# Patient Record
Sex: MALE | Race: WHITE | NOT HISPANIC OR LATINO | Employment: FULL TIME | URBAN - METROPOLITAN AREA
[De-identification: names, ages, dates, MRNs, and addresses within clinical notes are randomized per-mention and may not be internally consistent; named-entity substitution may affect disease eponyms.]

---

## 2023-05-20 LAB
ALANINE AMINOTRANSFERASE (SGPT) (U/L) IN SER/PLAS: 74 U/L (ref 10–52)
ALBUMIN (G/DL) IN SER/PLAS: 4.5 G/DL (ref 3.4–5)
ALBUMIN (MG/L) IN URINE: <7 MG/L
ALBUMIN/CREATININE (UG/MG) IN URINE: NORMAL UG/MG CRT (ref 0–30)
ALKALINE PHOSPHATASE (U/L) IN SER/PLAS: 63 U/L (ref 33–120)
ANION GAP IN SER/PLAS: 14 MMOL/L (ref 10–20)
APPEARANCE, URINE: CLEAR
ASPARTATE AMINOTRANSFERASE (SGOT) (U/L) IN SER/PLAS: 30 U/L (ref 9–39)
BASOPHILS (10*3/UL) IN BLOOD BY AUTOMATED COUNT: 0.03 X10E9/L (ref 0–0.1)
BASOPHILS/100 LEUKOCYTES IN BLOOD BY AUTOMATED COUNT: 0.6 % (ref 0–2)
BILIRUBIN TOTAL (MG/DL) IN SER/PLAS: 0.6 MG/DL (ref 0–1.2)
BILIRUBIN, URINE: NEGATIVE
BLOOD, URINE: NEGATIVE
CALCIUM (MG/DL) IN SER/PLAS: 9.6 MG/DL (ref 8.6–10.6)
CARBON DIOXIDE, TOTAL (MMOL/L) IN SER/PLAS: 27 MMOL/L (ref 21–32)
CHLORIDE (MMOL/L) IN SER/PLAS: 101 MMOL/L (ref 98–107)
CHOLESTEROL (MG/DL) IN SER/PLAS: 257 MG/DL (ref 0–199)
CHOLESTEROL IN HDL (MG/DL) IN SER/PLAS: 45.8 MG/DL
CHOLESTEROL/HDL RATIO: 5.6
COLOR, URINE: YELLOW
CORTISOL (UG/DL) IN SERUM - AM: 8.2 UG/DL (ref 5–20)
CREATININE (MG/DL) IN SER/PLAS: 0.87 MG/DL (ref 0.5–1.3)
CREATININE (MG/DL) IN URINE: 75.6 MG/DL (ref 20–370)
EOSINOPHILS (10*3/UL) IN BLOOD BY AUTOMATED COUNT: 0.05 X10E9/L (ref 0–0.7)
EOSINOPHILS/100 LEUKOCYTES IN BLOOD BY AUTOMATED COUNT: 1 % (ref 0–6)
ERYTHROCYTE DISTRIBUTION WIDTH (RATIO) BY AUTOMATED COUNT: 13.2 % (ref 11.5–14.5)
ERYTHROCYTE MEAN CORPUSCULAR HEMOGLOBIN CONCENTRATION (G/DL) BY AUTOMATED: 32.4 G/DL (ref 32–36)
ERYTHROCYTE MEAN CORPUSCULAR VOLUME (FL) BY AUTOMATED COUNT: 97 FL (ref 80–100)
ERYTHROCYTES (10*6/UL) IN BLOOD BY AUTOMATED COUNT: 5.19 X10E12/L (ref 4.5–5.9)
GFR MALE: >90 ML/MIN/1.73M2
GLUCOSE (MG/DL) IN SER/PLAS: 84 MG/DL (ref 74–99)
GLUCOSE, URINE: NEGATIVE MG/DL
HEMATOCRIT (%) IN BLOOD BY AUTOMATED COUNT: 50.6 % (ref 41–52)
HEMOGLOBIN (G/DL) IN BLOOD: 16.4 G/DL (ref 13.5–17.5)
IMMATURE GRANULOCYTES/100 LEUKOCYTES IN BLOOD BY AUTOMATED COUNT: 0.2 % (ref 0–0.9)
KETONES, URINE: NEGATIVE MG/DL
LDL: 184 MG/DL (ref 0–99)
LEUKOCYTE ESTERASE, URINE: NEGATIVE
LEUKOCYTES (10*3/UL) IN BLOOD BY AUTOMATED COUNT: 4.9 X10E9/L (ref 4.4–11.3)
LYMPHOCYTES (10*3/UL) IN BLOOD BY AUTOMATED COUNT: 1.61 X10E9/L (ref 1.2–4.8)
LYMPHOCYTES/100 LEUKOCYTES IN BLOOD BY AUTOMATED COUNT: 32.6 % (ref 13–44)
MONOCYTES (10*3/UL) IN BLOOD BY AUTOMATED COUNT: 0.36 X10E9/L (ref 0.1–1)
MONOCYTES/100 LEUKOCYTES IN BLOOD BY AUTOMATED COUNT: 7.3 % (ref 2–10)
NEUTROPHILS (10*3/UL) IN BLOOD BY AUTOMATED COUNT: 2.88 X10E9/L (ref 1.2–7.7)
NEUTROPHILS/100 LEUKOCYTES IN BLOOD BY AUTOMATED COUNT: 58.3 % (ref 40–80)
NITRITE, URINE: NEGATIVE
NRBC (PER 100 WBCS) BY AUTOMATED COUNT: 0 /100 WBC (ref 0–0)
PH, URINE: 8 (ref 5–8)
PLATELETS (10*3/UL) IN BLOOD AUTOMATED COUNT: 192 X10E9/L (ref 150–450)
POTASSIUM (MMOL/L) IN SER/PLAS: 3.8 MMOL/L (ref 3.5–5.3)
PROTEIN TOTAL: 7.6 G/DL (ref 6.4–8.2)
PROTEIN, URINE: NEGATIVE MG/DL
SODIUM (MMOL/L) IN SER/PLAS: 138 MMOL/L (ref 136–145)
SPECIFIC GRAVITY, URINE: 1.01 (ref 1–1.03)
THYROTROPIN (MIU/L) IN SER/PLAS BY DETECTION LIMIT <= 0.05 MIU/L: 1.54 MIU/L (ref 0.44–3.98)
TRIGLYCERIDE (MG/DL) IN SER/PLAS: 135 MG/DL (ref 0–149)
UREA NITROGEN (MG/DL) IN SER/PLAS: 17 MG/DL (ref 6–23)
UROBILINOGEN, URINE: <2 MG/DL (ref 0–1.9)
VLDL: 27 MG/DL (ref 0–40)

## 2023-05-23 LAB — ALDOSTERONE IN SERUM: 19.6 NG/DL

## 2023-09-29 ENCOUNTER — LAB (OUTPATIENT)
Dept: LAB | Facility: LAB | Age: 33
End: 2023-09-29
Payer: COMMERCIAL

## 2023-09-29 LAB — TOBACCO SCREEN, URINE: NEGATIVE

## 2023-10-18 ENCOUNTER — PHARMACY VISIT (OUTPATIENT)
Dept: PHARMACY | Facility: CLINIC | Age: 33
End: 2023-10-18
Payer: COMMERCIAL

## 2023-10-18 PROCEDURE — RXMED WILLOW AMBULATORY MEDICATION CHARGE

## 2023-10-26 DIAGNOSIS — I10 HYPERTENSION, UNSPECIFIED TYPE: ICD-10-CM

## 2023-10-26 RX ORDER — AMLODIPINE BESYLATE 5 MG/1
5 TABLET ORAL DAILY
Qty: 90 TABLET | Refills: 0 | Status: SHIPPED | OUTPATIENT
Start: 2023-10-26 | End: 2023-10-31 | Stop reason: CLARIF

## 2023-10-26 RX ORDER — AMLODIPINE BESYLATE 5 MG/1
5 TABLET ORAL DAILY
COMMUNITY
Start: 2023-05-18 | End: 2023-10-26 | Stop reason: SDUPTHER

## 2023-10-31 ENCOUNTER — TELEPHONE (OUTPATIENT)
Dept: PRIMARY CARE | Facility: CLINIC | Age: 33
End: 2023-10-31
Payer: COMMERCIAL

## 2023-10-31 DIAGNOSIS — I10 HYPERTENSION, UNSPECIFIED TYPE: Primary | ICD-10-CM

## 2023-10-31 RX ORDER — AMLODIPINE BESYLATE 5 MG/1
5 TABLET ORAL DAILY
Qty: 30 TABLET | Refills: 5 | Status: SHIPPED | OUTPATIENT
Start: 2023-10-31 | End: 2023-11-07 | Stop reason: WASHOUT

## 2023-11-06 ENCOUNTER — LAB (OUTPATIENT)
Dept: LAB | Facility: LAB | Age: 33
End: 2023-11-06
Payer: COMMERCIAL

## 2023-11-06 DIAGNOSIS — E78.5 HYPERLIPIDEMIA, UNSPECIFIED: Primary | ICD-10-CM

## 2023-11-06 DIAGNOSIS — Z79.899 OTHER LONG TERM (CURRENT) DRUG THERAPY: ICD-10-CM

## 2023-11-06 PROCEDURE — 36415 COLL VENOUS BLD VENIPUNCTURE: CPT

## 2023-11-06 PROCEDURE — 80061 LIPID PANEL: CPT

## 2023-11-06 PROCEDURE — 80076 HEPATIC FUNCTION PANEL: CPT

## 2023-11-07 ENCOUNTER — OFFICE VISIT (OUTPATIENT)
Dept: PRIMARY CARE | Facility: CLINIC | Age: 33
End: 2023-11-07
Payer: COMMERCIAL

## 2023-11-07 ENCOUNTER — PHARMACY VISIT (OUTPATIENT)
Dept: PHARMACY | Facility: CLINIC | Age: 33
End: 2023-11-07
Payer: COMMERCIAL

## 2023-11-07 VITALS
BODY MASS INDEX: 31.67 KG/M2 | HEIGHT: 68 IN | WEIGHT: 209 LBS | HEART RATE: 78 BPM | DIASTOLIC BLOOD PRESSURE: 90 MMHG | SYSTOLIC BLOOD PRESSURE: 148 MMHG

## 2023-11-07 DIAGNOSIS — Z82.49 FAMILY HISTORY OF ISCHEMIC HEART DISEASE: ICD-10-CM

## 2023-11-07 DIAGNOSIS — R06.83 SNORING: ICD-10-CM

## 2023-11-07 DIAGNOSIS — E78.2 MIXED HYPERLIPIDEMIA: ICD-10-CM

## 2023-11-07 DIAGNOSIS — I10 HYPERTENSION, UNSPECIFIED TYPE: Primary | ICD-10-CM

## 2023-11-07 LAB
ALBUMIN SERPL BCP-MCNC: 4.8 G/DL (ref 3.4–5)
ALP SERPL-CCNC: 66 U/L (ref 33–120)
ALT SERPL W P-5'-P-CCNC: 87 U/L (ref 10–52)
AST SERPL W P-5'-P-CCNC: 35 U/L (ref 9–39)
BILIRUB DIRECT SERPL-MCNC: 0.1 MG/DL (ref 0–0.3)
BILIRUB SERPL-MCNC: 0.6 MG/DL (ref 0–1.2)
CHOLEST SERPL-MCNC: 291 MG/DL (ref 0–199)
CHOLESTEROL/HDL RATIO: 7.9
HDLC SERPL-MCNC: 36.7 MG/DL
LDLC SERPL CALC-MCNC: 213 MG/DL
NON HDL CHOLESTEROL: 254 MG/DL (ref 0–149)
PROT SERPL-MCNC: 7.8 G/DL (ref 6.4–8.2)
TRIGL SERPL-MCNC: 205 MG/DL (ref 0–149)
VLDL: 41 MG/DL (ref 0–40)

## 2023-11-07 PROCEDURE — 99213 OFFICE O/P EST LOW 20 MIN: CPT | Performed by: INTERNAL MEDICINE

## 2023-11-07 PROCEDURE — 3077F SYST BP >= 140 MM HG: CPT | Performed by: INTERNAL MEDICINE

## 2023-11-07 PROCEDURE — 3080F DIAST BP >= 90 MM HG: CPT | Performed by: INTERNAL MEDICINE

## 2023-11-07 PROCEDURE — RXMED WILLOW AMBULATORY MEDICATION CHARGE

## 2023-11-07 PROCEDURE — 1036F TOBACCO NON-USER: CPT | Performed by: INTERNAL MEDICINE

## 2023-11-07 RX ORDER — BUSPIRONE HYDROCHLORIDE 10 MG/1
10 TABLET ORAL 3 TIMES DAILY
COMMUNITY

## 2023-11-07 RX ORDER — ATORVASTATIN CALCIUM 10 MG/1
10 TABLET, FILM COATED ORAL DAILY
Qty: 30 TABLET | Refills: 5 | Status: SHIPPED | OUTPATIENT
Start: 2023-11-07 | End: 2023-11-07 | Stop reason: SDUPTHER

## 2023-11-07 RX ORDER — FAMOTIDINE 20 MG/1
20 TABLET, FILM COATED ORAL DAILY
COMMUNITY

## 2023-11-07 RX ORDER — SERTRALINE HYDROCHLORIDE 100 MG/1
100 TABLET, FILM COATED ORAL DAILY
COMMUNITY

## 2023-11-07 RX ORDER — ALBUTEROL SULFATE 90 UG/1
2 AEROSOL, METERED RESPIRATORY (INHALATION) EVERY 6 HOURS PRN
COMMUNITY

## 2023-11-07 RX ORDER — ATORVASTATIN CALCIUM 10 MG/1
10 TABLET, FILM COATED ORAL DAILY
Qty: 30 TABLET | Refills: 5 | Status: SHIPPED | OUTPATIENT
Start: 2023-11-07 | End: 2024-05-05 | Stop reason: SDUPTHER

## 2023-11-07 RX ORDER — MONTELUKAST SODIUM 10 MG/1
10 TABLET ORAL NIGHTLY
COMMUNITY

## 2023-11-07 RX ORDER — FLUTICASONE PROPIONATE 50 MCG
1 SPRAY, SUSPENSION (ML) NASAL DAILY
COMMUNITY

## 2023-11-07 RX ORDER — AMLODIPINE AND BENAZEPRIL HYDROCHLORIDE 5; 10 MG/1; MG/1
1 CAPSULE ORAL DAILY
Qty: 30 CAPSULE | Refills: 11 | Status: SHIPPED | OUTPATIENT
Start: 2023-11-07 | End: 2024-11-06

## 2023-11-07 NOTE — PROGRESS NOTES
"Subjective   Patient ID: Jose Zavala is a 33 y.o. male who presents for Follow-up (Discuss lab results).    HPI lipids   Htn  Stress    Review of Systems    Objective   /90 (BP Location: Left arm, Patient Position: Sitting, BP Cuff Size: Adult)   Pulse 78   Ht 1.727 m (5' 8\")   Wt 94.8 kg (209 lb)   BMI 31.78 kg/m²     Physical Exam  Card rrr  Pulm cta  Abd soft nt bs plus    Assessment/Plan   Diagnoses and all orders for this visit:  Hypertension, unspecified type  Comments:  not at goal  Orders:  -     amLODIPine-benazepriL (LotreL) 5-10 mg capsule; Take 1 capsule by mouth once daily.  -     atorvastatin (Lipitor) 10 mg tablet; Take 1 tablet (10 mg) by mouth once daily.  -     Comprehensive Metabolic Panel; Future  -     Lipid Panel; Future  Mixed hyperlipidemia  Comments:  severe time to treat  Orders:  -     atorvastatin (Lipitor) 10 mg tablet; Take 1 tablet (10 mg) by mouth once daily.  -     Comprehensive Metabolic Panel; Future  -     Lipid Panel; Future  Family history of ischemic heart disease  Comments:  sign early  Orders:  -     Comprehensive Metabolic Panel; Future       "

## 2023-11-08 ENCOUNTER — PHARMACY VISIT (OUTPATIENT)
Dept: PHARMACY | Facility: CLINIC | Age: 33
End: 2023-11-08
Payer: COMMERCIAL

## 2023-11-08 PROCEDURE — RXMED WILLOW AMBULATORY MEDICATION CHARGE

## 2023-11-21 ENCOUNTER — APPOINTMENT (OUTPATIENT)
Dept: PRIMARY CARE | Facility: CLINIC | Age: 33
End: 2023-11-21
Payer: COMMERCIAL

## 2023-12-01 ENCOUNTER — PHARMACY VISIT (OUTPATIENT)
Dept: PHARMACY | Facility: CLINIC | Age: 33
End: 2023-12-01
Payer: COMMERCIAL

## 2023-12-01 PROCEDURE — RXMED WILLOW AMBULATORY MEDICATION CHARGE

## 2023-12-19 ENCOUNTER — CLINICAL SUPPORT (OUTPATIENT)
Dept: SLEEP MEDICINE | Facility: HOSPITAL | Age: 33
End: 2023-12-19
Payer: COMMERCIAL

## 2023-12-19 ENCOUNTER — APPOINTMENT (OUTPATIENT)
Dept: SLEEP MEDICINE | Facility: HOSPITAL | Age: 33
End: 2023-12-19
Payer: COMMERCIAL

## 2023-12-19 DIAGNOSIS — R06.83 SNORING: ICD-10-CM

## 2023-12-19 PROCEDURE — 95806 SLEEP STUDY UNATT&RESP EFFT: CPT | Performed by: PSYCHIATRY & NEUROLOGY

## 2024-01-04 PROCEDURE — RXMED WILLOW AMBULATORY MEDICATION CHARGE

## 2024-01-05 ENCOUNTER — PHARMACY VISIT (OUTPATIENT)
Dept: PHARMACY | Facility: CLINIC | Age: 34
End: 2024-01-05
Payer: COMMERCIAL

## 2024-01-09 ENCOUNTER — LAB (OUTPATIENT)
Dept: LAB | Facility: LAB | Age: 34
End: 2024-01-09
Payer: COMMERCIAL

## 2024-01-09 DIAGNOSIS — I10 HYPERTENSION, UNSPECIFIED TYPE: ICD-10-CM

## 2024-01-09 DIAGNOSIS — Z82.49 FAMILY HISTORY OF ISCHEMIC HEART DISEASE: ICD-10-CM

## 2024-01-09 DIAGNOSIS — E78.2 MIXED HYPERLIPIDEMIA: ICD-10-CM

## 2024-01-09 PROCEDURE — 80061 LIPID PANEL: CPT

## 2024-01-09 PROCEDURE — 80053 COMPREHEN METABOLIC PANEL: CPT

## 2024-01-09 PROCEDURE — 36415 COLL VENOUS BLD VENIPUNCTURE: CPT

## 2024-01-10 LAB
ALBUMIN SERPL BCP-MCNC: 4.6 G/DL (ref 3.4–5)
ALP SERPL-CCNC: 64 U/L (ref 33–120)
ALT SERPL W P-5'-P-CCNC: 78 U/L (ref 10–52)
ANION GAP SERPL CALC-SCNC: 11 MMOL/L (ref 10–20)
AST SERPL W P-5'-P-CCNC: 31 U/L (ref 9–39)
BILIRUB SERPL-MCNC: 0.4 MG/DL (ref 0–1.2)
BUN SERPL-MCNC: 13 MG/DL (ref 6–23)
CALCIUM SERPL-MCNC: 9.4 MG/DL (ref 8.6–10.6)
CHLORIDE SERPL-SCNC: 102 MMOL/L (ref 98–107)
CHOLEST SERPL-MCNC: 172 MG/DL (ref 0–199)
CHOLESTEROL/HDL RATIO: 4.4
CO2 SERPL-SCNC: 30 MMOL/L (ref 21–32)
CREAT SERPL-MCNC: 0.8 MG/DL (ref 0.5–1.3)
EGFRCR SERPLBLD CKD-EPI 2021: >90 ML/MIN/1.73M*2
GLUCOSE SERPL-MCNC: 90 MG/DL (ref 74–99)
HDLC SERPL-MCNC: 39 MG/DL
LDLC SERPL CALC-MCNC: 104 MG/DL
NON HDL CHOLESTEROL: 133 MG/DL (ref 0–149)
POTASSIUM SERPL-SCNC: 4.2 MMOL/L (ref 3.5–5.3)
PROT SERPL-MCNC: 7.5 G/DL (ref 6.4–8.2)
SODIUM SERPL-SCNC: 139 MMOL/L (ref 136–145)
TRIGL SERPL-MCNC: 145 MG/DL (ref 0–149)
VLDL: 29 MG/DL (ref 0–40)

## 2024-01-16 ENCOUNTER — APPOINTMENT (OUTPATIENT)
Dept: PRIMARY CARE | Facility: CLINIC | Age: 34
End: 2024-01-16
Payer: COMMERCIAL

## 2024-01-19 ENCOUNTER — TELEPHONE (OUTPATIENT)
Dept: PRIMARY CARE | Facility: CLINIC | Age: 34
End: 2024-01-19
Payer: COMMERCIAL

## 2024-01-19 NOTE — TELEPHONE ENCOUNTER
----- Message from Christian Titus MD sent at 1/19/2024 12:08 PM EST -----  Labs are normal  MUCH IMPROVED

## 2024-02-05 PROCEDURE — RXMED WILLOW AMBULATORY MEDICATION CHARGE

## 2024-02-06 ENCOUNTER — PHARMACY VISIT (OUTPATIENT)
Dept: PHARMACY | Facility: CLINIC | Age: 34
End: 2024-02-06
Payer: COMMERCIAL

## 2024-03-05 PROCEDURE — RXMED WILLOW AMBULATORY MEDICATION CHARGE

## 2024-03-07 ENCOUNTER — PHARMACY VISIT (OUTPATIENT)
Dept: PHARMACY | Facility: CLINIC | Age: 34
End: 2024-03-07
Payer: COMMERCIAL

## 2024-04-05 PROCEDURE — RXMED WILLOW AMBULATORY MEDICATION CHARGE

## 2024-04-07 ENCOUNTER — PHARMACY VISIT (OUTPATIENT)
Dept: PHARMACY | Facility: CLINIC | Age: 34
End: 2024-04-07
Payer: COMMERCIAL

## 2024-04-15 PROCEDURE — RXMED WILLOW AMBULATORY MEDICATION CHARGE

## 2024-04-21 ENCOUNTER — PHARMACY VISIT (OUTPATIENT)
Dept: PHARMACY | Facility: CLINIC | Age: 34
End: 2024-04-21
Payer: COMMERCIAL

## 2024-04-22 ENCOUNTER — APPOINTMENT (OUTPATIENT)
Dept: GASTROENTEROLOGY | Facility: CLINIC | Age: 34
End: 2024-04-22
Payer: COMMERCIAL

## 2024-05-05 DIAGNOSIS — E78.2 MIXED HYPERLIPIDEMIA: ICD-10-CM

## 2024-05-05 DIAGNOSIS — I10 HYPERTENSION, UNSPECIFIED TYPE: ICD-10-CM

## 2024-05-06 PROCEDURE — RXMED WILLOW AMBULATORY MEDICATION CHARGE

## 2024-05-06 RX ORDER — ATORVASTATIN CALCIUM 10 MG/1
10 TABLET, FILM COATED ORAL DAILY
Qty: 30 TABLET | Refills: 5 | Status: SHIPPED | OUTPATIENT
Start: 2024-05-06 | End: 2024-11-02

## 2024-05-06 ASSESSMENT — PROMIS GLOBAL HEALTH SCALE
RATE_QUALITY_OF_LIFE: VERY GOOD
CARRYOUT_SOCIAL_ACTIVITIES: VERY GOOD
RATE_PHYSICAL_HEALTH: GOOD
RATE_MENTAL_HEALTH: GOOD
EMOTIONAL_PROBLEMS: SOMETIMES
RATE_AVERAGE_FATIGUE: MILD
RATE_GENERAL_HEALTH: GOOD
RATE_AVERAGE_PAIN: 0
CARRYOUT_PHYSICAL_ACTIVITIES: COMPLETELY
RATE_SOCIAL_SATISFACTION: GOOD

## 2024-05-07 ENCOUNTER — OFFICE VISIT (OUTPATIENT)
Dept: PRIMARY CARE | Facility: CLINIC | Age: 34
End: 2024-05-07
Payer: COMMERCIAL

## 2024-05-07 ENCOUNTER — PHARMACY VISIT (OUTPATIENT)
Dept: PHARMACY | Facility: CLINIC | Age: 34
End: 2024-05-07
Payer: COMMERCIAL

## 2024-05-07 VITALS
WEIGHT: 204 LBS | DIASTOLIC BLOOD PRESSURE: 74 MMHG | HEART RATE: 94 BPM | SYSTOLIC BLOOD PRESSURE: 116 MMHG | BODY MASS INDEX: 30.92 KG/M2 | OXYGEN SATURATION: 97 % | HEIGHT: 68 IN

## 2024-05-07 DIAGNOSIS — F41.9 ANXIETY: ICD-10-CM

## 2024-05-07 DIAGNOSIS — Z00.00 WELLNESS EXAMINATION: Primary | ICD-10-CM

## 2024-05-07 DIAGNOSIS — Z13.9 SCREENING DUE: ICD-10-CM

## 2024-05-07 DIAGNOSIS — R73.03 PREDIABETES: ICD-10-CM

## 2024-05-07 DIAGNOSIS — J31.0 OTHER RHINITIS: ICD-10-CM

## 2024-05-07 DIAGNOSIS — R79.89 LOW TESTOSTERONE: ICD-10-CM

## 2024-05-07 PROBLEM — E03.9 HYPOTHYROIDISM: Status: ACTIVE | Noted: 2024-05-07

## 2024-05-07 PROCEDURE — 99395 PREV VISIT EST AGE 18-39: CPT | Performed by: INTERNAL MEDICINE

## 2024-05-07 PROCEDURE — 3008F BODY MASS INDEX DOCD: CPT | Performed by: INTERNAL MEDICINE

## 2024-05-07 PROCEDURE — 3074F SYST BP LT 130 MM HG: CPT | Performed by: INTERNAL MEDICINE

## 2024-05-07 PROCEDURE — 3078F DIAST BP <80 MM HG: CPT | Performed by: INTERNAL MEDICINE

## 2024-05-07 PROCEDURE — RXMED WILLOW AMBULATORY MEDICATION CHARGE

## 2024-05-07 PROCEDURE — 1036F TOBACCO NON-USER: CPT | Performed by: INTERNAL MEDICINE

## 2024-05-07 RX ORDER — FLUTICASONE PROPIONATE 50 MCG
1 SPRAY, SUSPENSION (ML) NASAL
Qty: 16 G | Refills: 5 | Status: SHIPPED | OUTPATIENT
Start: 2024-05-07 | End: 2025-05-07

## 2024-05-07 NOTE — PROGRESS NOTES
"Subjective   Patient ID: Jose Zavala is a 34 y.o. male who presents for Annual Exam.    HPI FEELS WELL       WIFE IS NOW WORKING     NO KIDS YET     BREATHING IS GOOD     MOOD IS GOOD    ENERGY IS GOOD     SLEEP IS GOOD     DIET IS FRESH FRUITS VEGGIES     SWEETS SOME     ETOH  6 PER WEEK     TOBACCO NONE     PSHX NONE NEW    FMHX MA LIPIDS    D A HTN   SIBS BRO CHOL   Review of Systems   Respiratory:          ASTHMA     LIPIDS   Objective   /74   Pulse 94   Ht 1.727 m (5' 8\")   Wt 92.5 kg (204 lb)   SpO2 97%   BMI 31.02 kg/m²     Physical Exam  Constitutional:       Appearance: Normal appearance.   HENT:      Head: Normocephalic and atraumatic.      Right Ear: Tympanic membrane normal.      Left Ear: Tympanic membrane normal.      Nose: Nose normal.   Eyes:      Extraocular Movements: Extraocular movements intact.      Conjunctiva/sclera: Conjunctivae normal.      Pupils: Pupils are equal, round, and reactive to light.   Cardiovascular:      Rate and Rhythm: Normal rate and regular rhythm.      Pulses: Normal pulses.      Heart sounds: Normal heart sounds.   Pulmonary:      Effort: Pulmonary effort is normal.      Breath sounds: Normal breath sounds.   Abdominal:      General: Abdomen is flat. Bowel sounds are normal.      Palpations: Abdomen is soft.   Musculoskeletal:         General: Normal range of motion.      Cervical back: Normal range of motion and neck supple.   Skin:     General: Skin is warm and dry.   Neurological:      General: No focal deficit present.      Mental Status: Mental status is at baseline.   Psychiatric:         Mood and Affect: Mood normal.         Behavior: Behavior normal.         Thought Content: Thought content normal.         Judgment: Judgment normal.       Assessment/Plan   Diagnoses and all orders for this visit:  Wellness examination  -     CBC; Future  -     Lipid Panel; Future  -     Comprehensive Metabolic Panel; Future  -     Urinalysis with Reflex Culture and " Microscopic  -     Vitamin B12; Future  Screening due  -     TSH with reflex to Free T4 if abnormal; Future  -     Urinalysis with Reflex Culture and Microscopic  -     Vitamin B12; Future  Low testosterone  -     Testosterone; Future  -     Urinalysis with Reflex Culture and Microscopic  -     Vitamin B12; Future  Prediabetes  -     Hemoglobin A1C; Future  -     Urinalysis with Reflex Culture and Microscopic  -     Vitamin B12; Future  BMI 31.0-31.9,adult  Comments:  DIET CARDIO  Other rhinitis  -     fluticasone (Flonase Sensimist) 27.5 mcg/actuation nasal spray; Administer 1 spray into each nostril once daily.  Anxiety  Other orders  -     Follow Up In Primary Care - Established

## 2024-05-21 ENCOUNTER — APPOINTMENT (OUTPATIENT)
Dept: PRIMARY CARE | Facility: CLINIC | Age: 34
End: 2024-05-21
Payer: COMMERCIAL

## 2024-06-06 ENCOUNTER — PHARMACY VISIT (OUTPATIENT)
Dept: PHARMACY | Facility: CLINIC | Age: 34
End: 2024-06-06
Payer: COMMERCIAL

## 2024-06-06 PROCEDURE — RXMED WILLOW AMBULATORY MEDICATION CHARGE

## 2024-06-07 PROCEDURE — RXMED WILLOW AMBULATORY MEDICATION CHARGE

## 2024-06-10 ENCOUNTER — PHARMACY VISIT (OUTPATIENT)
Dept: PHARMACY | Facility: CLINIC | Age: 34
End: 2024-06-10
Payer: COMMERCIAL

## 2024-06-25 PROBLEM — K31.A0 INTESTINAL METAPLASIA OF STOMACH: Status: ACTIVE | Noted: 2024-06-25

## 2024-06-25 PROBLEM — J45.50: Status: ACTIVE | Noted: 2022-08-08

## 2024-06-25 PROBLEM — J30.89 ENVIRONMENTAL AND SEASONAL ALLERGIES: Status: ACTIVE | Noted: 2022-08-08

## 2024-06-25 PROBLEM — K21.9 GASTROESOPHAGEAL REFLUX DISEASE: Status: ACTIVE | Noted: 2024-06-25

## 2024-06-25 PROBLEM — J45.909 ASTHMA (HHS-HCC): Status: ACTIVE | Noted: 2024-06-25

## 2024-06-25 PROBLEM — K29.50 CHRONIC GASTRITIS: Status: ACTIVE | Noted: 2024-06-25

## 2024-06-25 PROBLEM — Z20.822 CONTACT WITH AND (SUSPECTED) EXPOSURE TO COVID-19: Status: ACTIVE | Noted: 2023-03-19

## 2024-06-25 PROBLEM — R74.8 ELEVATED LIVER ENZYMES: Status: ACTIVE | Noted: 2024-06-25

## 2024-06-25 PROBLEM — R06.83 SNORING: Status: ACTIVE | Noted: 2024-06-25

## 2024-07-02 ENCOUNTER — LAB (OUTPATIENT)
Dept: LAB | Facility: LAB | Age: 34
End: 2024-07-02
Payer: COMMERCIAL

## 2024-07-02 DIAGNOSIS — R79.89 LOW TESTOSTERONE: ICD-10-CM

## 2024-07-02 DIAGNOSIS — R73.03 PREDIABETES: ICD-10-CM

## 2024-07-02 DIAGNOSIS — Z00.00 WELLNESS EXAMINATION: ICD-10-CM

## 2024-07-02 DIAGNOSIS — Z13.9 SCREENING DUE: ICD-10-CM

## 2024-07-02 PROCEDURE — 84443 ASSAY THYROID STIM HORMONE: CPT

## 2024-07-02 PROCEDURE — 85027 COMPLETE CBC AUTOMATED: CPT

## 2024-07-02 PROCEDURE — 83036 HEMOGLOBIN GLYCOSYLATED A1C: CPT

## 2024-07-02 PROCEDURE — 81003 URINALYSIS AUTO W/O SCOPE: CPT

## 2024-07-02 PROCEDURE — 80061 LIPID PANEL: CPT

## 2024-07-02 PROCEDURE — 82607 VITAMIN B-12: CPT

## 2024-07-02 PROCEDURE — 80053 COMPREHEN METABOLIC PANEL: CPT

## 2024-07-02 PROCEDURE — 36415 COLL VENOUS BLD VENIPUNCTURE: CPT

## 2024-07-02 PROCEDURE — RXMED WILLOW AMBULATORY MEDICATION CHARGE

## 2024-07-02 PROCEDURE — 84403 ASSAY OF TOTAL TESTOSTERONE: CPT

## 2024-07-03 LAB
ALBUMIN SERPL BCP-MCNC: 4.5 G/DL (ref 3.4–5)
ALP SERPL-CCNC: 65 U/L (ref 33–120)
ALT SERPL W P-5'-P-CCNC: 72 U/L (ref 10–52)
ANION GAP SERPL CALC-SCNC: 12 MMOL/L (ref 10–20)
APPEARANCE UR: CLEAR
AST SERPL W P-5'-P-CCNC: 31 U/L (ref 9–39)
BILIRUB SERPL-MCNC: 0.6 MG/DL (ref 0–1.2)
BILIRUB UR STRIP.AUTO-MCNC: NEGATIVE MG/DL
BUN SERPL-MCNC: 11 MG/DL (ref 6–23)
CALCIUM SERPL-MCNC: 9.5 MG/DL (ref 8.6–10.6)
CHLORIDE SERPL-SCNC: 103 MMOL/L (ref 98–107)
CHOLEST SERPL-MCNC: 189 MG/DL (ref 0–199)
CHOLESTEROL/HDL RATIO: 5
CO2 SERPL-SCNC: 29 MMOL/L (ref 21–32)
COLOR UR: YELLOW
CREAT SERPL-MCNC: 0.82 MG/DL (ref 0.5–1.3)
EGFRCR SERPLBLD CKD-EPI 2021: >90 ML/MIN/1.73M*2
ERYTHROCYTE [DISTWIDTH] IN BLOOD BY AUTOMATED COUNT: 12.6 % (ref 11.5–14.5)
EST. AVERAGE GLUCOSE BLD GHB EST-MCNC: 100 MG/DL
GLUCOSE SERPL-MCNC: 86 MG/DL (ref 74–99)
GLUCOSE UR STRIP.AUTO-MCNC: NORMAL MG/DL
HBA1C MFR BLD: 5.1 %
HCT VFR BLD AUTO: 51.3 % (ref 41–52)
HDLC SERPL-MCNC: 37.6 MG/DL
HGB BLD-MCNC: 16.7 G/DL (ref 13.5–17.5)
HOLD SPECIMEN: NORMAL
KETONES UR STRIP.AUTO-MCNC: NEGATIVE MG/DL
LDLC SERPL CALC-MCNC: 123 MG/DL
LEUKOCYTE ESTERASE UR QL STRIP.AUTO: NEGATIVE
MCH RBC QN AUTO: 31.6 PG (ref 26–34)
MCHC RBC AUTO-ENTMCNC: 32.6 G/DL (ref 32–36)
MCV RBC AUTO: 97 FL (ref 80–100)
NITRITE UR QL STRIP.AUTO: NEGATIVE
NON HDL CHOLESTEROL: 151 MG/DL (ref 0–149)
NRBC BLD-RTO: 0 /100 WBCS (ref 0–0)
PH UR STRIP.AUTO: 7 [PH]
PLATELET # BLD AUTO: 208 X10*3/UL (ref 150–450)
POTASSIUM SERPL-SCNC: 4.1 MMOL/L (ref 3.5–5.3)
PROT SERPL-MCNC: 7.4 G/DL (ref 6.4–8.2)
PROT UR STRIP.AUTO-MCNC: NEGATIVE MG/DL
RBC # BLD AUTO: 5.28 X10*6/UL (ref 4.5–5.9)
RBC # UR STRIP.AUTO: NEGATIVE /UL
SODIUM SERPL-SCNC: 140 MMOL/L (ref 136–145)
SP GR UR STRIP.AUTO: 1.02
TESTOST SERPL-MCNC: 390 NG/DL (ref 240–1000)
TRIGL SERPL-MCNC: 141 MG/DL (ref 0–149)
TSH SERPL-ACNC: 1.02 MIU/L (ref 0.44–3.98)
UROBILINOGEN UR STRIP.AUTO-MCNC: NORMAL MG/DL
VIT B12 SERPL-MCNC: 580 PG/ML (ref 211–911)
VLDL: 28 MG/DL (ref 0–40)
WBC # BLD AUTO: 5.4 X10*3/UL (ref 4.4–11.3)

## 2024-07-03 PROCEDURE — RXMED WILLOW AMBULATORY MEDICATION CHARGE

## 2024-07-05 ENCOUNTER — PHARMACY VISIT (OUTPATIENT)
Dept: PHARMACY | Facility: CLINIC | Age: 34
End: 2024-07-05
Payer: COMMERCIAL

## 2024-07-11 ENCOUNTER — TELEPHONE (OUTPATIENT)
Dept: PRIMARY CARE | Facility: CLINIC | Age: 34
End: 2024-07-11
Payer: COMMERCIAL

## 2024-07-11 NOTE — TELEPHONE ENCOUNTER
----- Message from Christian Titus sent at 7/11/2024  6:47 AM EDT -----  Labs are normal with one liver enzyme minimally elevated    
Sent patient note on mychart advising results    
TELEMETRY

## 2024-07-22 DIAGNOSIS — J45.50: ICD-10-CM

## 2024-07-23 RX ORDER — FLUTICASONE FUROATE AND VILANTEROL 200; 25 UG/1; UG/1
1 POWDER RESPIRATORY (INHALATION) DAILY
Qty: 180 EACH | Refills: 3 | Status: SHIPPED | OUTPATIENT
Start: 2024-07-23 | End: 2025-07-23

## 2024-07-23 NOTE — TELEPHONE ENCOUNTER
Pt reached out via [employer] message regarding the need of a refill on his Breo. Refill order placed and routed to Dr. Avila to sign. Pt was updated via [employer] message.

## 2024-07-26 ENCOUNTER — APPOINTMENT (OUTPATIENT)
Dept: PULMONOLOGY | Facility: CLINIC | Age: 34
End: 2024-07-26
Payer: COMMERCIAL

## 2024-07-26 DIAGNOSIS — J45.50: ICD-10-CM

## 2024-07-29 RX ORDER — FLUTICASONE FUROATE AND VILANTEROL 200; 25 UG/1; UG/1
1 POWDER RESPIRATORY (INHALATION) DAILY
Qty: 180 EACH | Refills: 3 | Status: SHIPPED | OUTPATIENT
Start: 2024-07-29 | End: 2025-07-29

## 2024-07-31 ENCOUNTER — PHARMACY VISIT (OUTPATIENT)
Dept: PHARMACY | Facility: CLINIC | Age: 34
End: 2024-07-31
Payer: COMMERCIAL

## 2024-07-31 PROCEDURE — RXMED WILLOW AMBULATORY MEDICATION CHARGE

## 2024-08-08 ENCOUNTER — APPOINTMENT (OUTPATIENT)
Dept: GASTROENTEROLOGY | Facility: CLINIC | Age: 34
End: 2024-08-08
Payer: COMMERCIAL

## 2024-08-27 ENCOUNTER — PATIENT MESSAGE (OUTPATIENT)
Dept: PRIMARY CARE | Facility: CLINIC | Age: 34
End: 2024-08-27
Payer: COMMERCIAL

## 2024-08-27 DIAGNOSIS — J31.0 OTHER RHINITIS: ICD-10-CM

## 2024-08-27 RX ORDER — MONTELUKAST SODIUM 10 MG/1
10 TABLET ORAL NIGHTLY
Qty: 90 TABLET | Refills: 1 | Status: SHIPPED | OUTPATIENT
Start: 2024-08-27

## 2024-08-28 PROCEDURE — RXMED WILLOW AMBULATORY MEDICATION CHARGE

## 2024-08-30 ENCOUNTER — PHARMACY VISIT (OUTPATIENT)
Dept: PHARMACY | Facility: CLINIC | Age: 34
End: 2024-08-30
Payer: COMMERCIAL

## 2024-09-06 ENCOUNTER — TELEPHONE (OUTPATIENT)
Dept: PULMONOLOGY | Facility: HOSPITAL | Age: 34
End: 2024-09-06
Payer: COMMERCIAL

## 2024-09-06 NOTE — TELEPHONE ENCOUNTER
Pt called with c/o sob and productive cough. He has been swallowing the mucous so is unsure of its color. He hasn't noticed wheezing yet, which he attributes it to using his aerosol machine. He denies fever and chills. His family was +Covid about 2 weeks ago. He was asymptomatic, but still took a covid test that was negative. He will take another covid test today. He is currently on vacation at [facility]. He brought his aerosol machine with him and is taking his duonebs every 6 hours and Pulmicort neb BID. He is also taking his Breo, singular, and zyrtec as prescribed. He is getting some relief with his aerosol treatments, but its not long before symptoms return. He states he doesn't like to take prednisone, but feels like he may need it this time. He also needs a refill for his duonebs. He stated that he will be in [facility] until Monday. For the refill of duonebs and any possible new prescriptions send to [facility] on [address] [place] [facility] [place] [address]. Dr. Avila notified.

## 2024-09-07 DIAGNOSIS — J45.50: Primary | ICD-10-CM

## 2024-09-07 RX ORDER — AZITHROMYCIN 250 MG/1
250 TABLET, FILM COATED ORAL DAILY
Qty: 6 TABLET | Refills: 0 | Status: SHIPPED | OUTPATIENT
Start: 2024-09-07 | End: 2024-09-19

## 2024-09-07 RX ORDER — PREDNISONE 20 MG/1
40 TABLET ORAL DAILY
Qty: 10 TABLET | Refills: 0 | Status: SHIPPED | OUTPATIENT
Start: 2024-09-07 | End: 2024-09-10

## 2024-09-07 RX ORDER — IPRATROPIUM BROMIDE AND ALBUTEROL SULFATE 2.5; .5 MG/3ML; MG/3ML
3 SOLUTION RESPIRATORY (INHALATION) EVERY 6 HOURS PRN
Qty: 360 ML | Refills: 5 | Status: SHIPPED | OUTPATIENT
Start: 2024-09-07

## 2024-09-07 NOTE — PROGRESS NOTES
Follow up on below message. Rx for prednisone, Duonebs, and Z-pack sent to requested pharmacy.     Goyo Avila MD  Staff Physician - Interventional Pulmonology  12:41 AM  09/07/24        LAYTON Geller MD  Hi Dr. Avila,  Mr. Zavala called with c/o increase sob and productive cough. He has just been swallowing the mucous so is unsure of its color. He hasn't noticed wheezing yet, which he attributes it to using his aerosol machine. He denies fever and chills. His family was +Covid about 2 weeks ago. He was asymptomatic, but still took a covid test that was negative. He will take another covid test today. He is currently on vacation at [facility]. He brought his aerosol machine with him and is taking his duonebs every 6 hours and Pulmicort neb BID. He is also taking his Breo, singular, and zyrtec as prescribed. He is getting some relief with his aerosol treatments, but its not long before symptoms return. He doesn't like to take prednisone, but feels like he may need it this time. He also needs a refill for his duonebs. He will be in [facility] until Monday. He asks that if you agree to the prednisone and duonebs to send it to [facility] on [address] [place] [address]. Let me know if you have any questions.  Thank you,  Tami

## 2024-09-10 ENCOUNTER — APPOINTMENT (OUTPATIENT)
Dept: URGENT CARE | Age: 34
End: 2024-09-10
Payer: COMMERCIAL

## 2024-09-10 ENCOUNTER — OFFICE VISIT (OUTPATIENT)
Dept: URGENT CARE | Age: 34
End: 2024-09-10
Payer: COMMERCIAL

## 2024-09-10 VITALS
SYSTOLIC BLOOD PRESSURE: 154 MMHG | OXYGEN SATURATION: 99 % | RESPIRATION RATE: 19 BRPM | HEART RATE: 82 BPM | WEIGHT: 205 LBS | TEMPERATURE: 97.8 F | DIASTOLIC BLOOD PRESSURE: 108 MMHG | BODY MASS INDEX: 31.17 KG/M2

## 2024-09-10 DIAGNOSIS — J45.50: ICD-10-CM

## 2024-09-10 DIAGNOSIS — J45.909 ASTHMA IN ADULT WITHOUT COMPLICATION, UNSPECIFIED ASTHMA SEVERITY, UNSPECIFIED WHETHER PERSISTENT (HHS-HCC): Primary | ICD-10-CM

## 2024-09-10 RX ORDER — PREDNISONE 10 MG/1
TABLET ORAL
Qty: 20 TABLET | Refills: 0 | Status: SHIPPED | OUTPATIENT
Start: 2024-09-10 | End: 2024-09-18

## 2024-09-10 ASSESSMENT — ENCOUNTER SYMPTOMS
COUGH: 1
CARDIOVASCULAR NEGATIVE: 1
CHEST TIGHTNESS: 1
SHORTNESS OF BREATH: 1
CONSTITUTIONAL NEGATIVE: 1

## 2024-09-10 NOTE — PROGRESS NOTES
Subjective   Patient ID: Jose Zavala is a 34 y.o. male. They present today with a chief complaint of Asthma (Asthma flare up for 10 days, wants cxr).    History of Present Illness  Presents to clinic today with complaints of asthma flare.  Patient states that this been ongoing for 9 to 10 days.  He voices wife had COVID a couple weeks ago.  He also went down to [facility] before this began.  He denies fevers.  Denies body aches or chills.  He states that he has had mild shortness of breath.  He does have a history of asthma and is on Singulair, Flonase along with at home DuoNeb treatments.  He states that they have helped however he still gets short of breath.  He is currently on day 4 of prednisone.  Patient did contact his pulmonologist who also sent him a Z-Eugene.  He took 40 for 3 days and then was told by his pharmacist family member to take 60 which she has taken 1 day of.      Asthma  Associated symptoms: cough and shortness of breath        Past Medical History  Allergies as of 09/10/2024    (No Known Allergies)       (Not in a hospital admission)       History reviewed. No pertinent past medical history.    Past Surgical History:   Procedure Laterality Date    MOUTH SURGERY  09/20/2016    Oral Surgery Tooth Extraction    TYMPANOSTOMY TUBE PLACEMENT  09/20/2016    Ear Pressure Equalization Tube, Insertion, Bilaterally        reports that he has quit smoking. His smoking use included cigarettes. He has been exposed to tobacco smoke. He has never used smokeless tobacco. He reports that he does not currently use alcohol. He reports that he does not use drugs.    Review of Systems  Review of Systems   Constitutional: Negative.    HENT: Negative.     Respiratory:  Positive for cough, chest tightness and shortness of breath.    Cardiovascular: Negative.                                   Objective    Vitals:    09/10/24 1044   BP: (!) 154/108   Pulse: 82   Resp: 19   Temp: 36.6 °C (97.8 °F)   SpO2: 99%   Weight:  93 kg (205 lb)     No LMP for male patient.    Physical Exam  Constitutional:       General: He is not in acute distress.     Appearance: He is not ill-appearing, toxic-appearing or diaphoretic.   Cardiovascular:      Rate and Rhythm: Normal rate and regular rhythm.   Pulmonary:      Effort: No accessory muscle usage, prolonged expiration, respiratory distress or retractions.      Breath sounds: Normal breath sounds and air entry.   Neurological:      Mental Status: He is alert.         Procedures    Point of Care Test & Imaging Results from this visit  Results for orders placed or performed in visit on 07/02/24   CBC   Result Value Ref Range    WBC 5.4 4.4 - 11.3 x10*3/uL    nRBC 0.0 0.0 - 0.0 /100 WBCs    RBC 5.28 4.50 - 5.90 x10*6/uL    Hemoglobin 16.7 13.5 - 17.5 g/dL    Hematocrit 51.3 41.0 - 52.0 %    MCV 97 80 - 100 fL    MCH 31.6 26.0 - 34.0 pg    MCHC 32.6 32.0 - 36.0 g/dL    RDW 12.6 11.5 - 14.5 %    Platelets 208 150 - 450 x10*3/uL   Lipid Panel   Result Value Ref Range    Cholesterol 189 0 - 199 mg/dL    HDL-Cholesterol 37.6 mg/dL    Cholesterol/HDL Ratio 5.0     LDL Calculated 123 (H) <=99 mg/dL    VLDL 28 0 - 40 mg/dL    Triglycerides 141 0 - 149 mg/dL    Non HDL Cholesterol 151 (H) 0 - 149 mg/dL   Comprehensive Metabolic Panel   Result Value Ref Range    Glucose 86 74 - 99 mg/dL    Sodium 140 136 - 145 mmol/L    Potassium 4.1 3.5 - 5.3 mmol/L    Chloride 103 98 - 107 mmol/L    Bicarbonate 29 21 - 32 mmol/L    Anion Gap 12 10 - 20 mmol/L    Urea Nitrogen 11 6 - 23 mg/dL    Creatinine 0.82 0.50 - 1.30 mg/dL    eGFR >90 >60 mL/min/1.73m*2    Calcium 9.5 8.6 - 10.6 mg/dL    Albumin 4.5 3.4 - 5.0 g/dL    Alkaline Phosphatase 65 33 - 120 U/L    Total Protein 7.4 6.4 - 8.2 g/dL    AST 31 9 - 39 U/L    Bilirubin, Total 0.6 0.0 - 1.2 mg/dL    ALT 72 (H) 10 - 52 U/L   TSH with reflex to Free T4 if abnormal   Result Value Ref Range    Thyroid Stimulating Hormone 1.02 0.44 - 3.98 mIU/L   Testosterone   Result  Value Ref Range    Testosterone 390 240 - 1,000 ng/dL   Hemoglobin A1C   Result Value Ref Range    Hemoglobin A1C 5.1 see below %    Estimated Average Glucose 100 Not Established mg/dL   Vitamin B12   Result Value Ref Range    Vitamin B12 580 211 - 911 pg/mL     No results found.    Diagnostic study results (if any) were reviewed by Lalit Jeffries PA-C.    Assessment/Plan   Allergies, medications, history, and pertinent labs/EKGs/Imaging reviewed by Lalit Jeffries PA-C.     Medical Decision Making  Chest x-ray was completed and reviewed by myself.  I do not see any acute abnormality.  I did discuss with patient and he does state he has been on a prednisone taper before.  We will slowly taper him off the high doses he has been on.  Advised him to follow-up with pulmonology and if anything worsens please go to the ER.    Orders and Diagnoses  Diagnoses and all orders for this visit:  Asthma in adult without complication, unspecified asthma severity, unspecified whether persistent ([facility])  -     XR chest 2 views      Medical Admin Record      Follow Up Instructions  No follow-ups on file.    Patient disposition: Home    Electronically signed by Lalit Jeffries PA-C  10:58 AM

## 2024-09-13 ENCOUNTER — APPOINTMENT (OUTPATIENT)
Dept: RADIOLOGY | Facility: HOSPITAL | Age: 34
End: 2024-09-13
Payer: COMMERCIAL

## 2024-09-13 ENCOUNTER — E-VISIT (OUTPATIENT)
Dept: PRIMARY CARE | Facility: CLINIC | Age: 34
End: 2024-09-13
Payer: COMMERCIAL

## 2024-09-13 ENCOUNTER — HOSPITAL ENCOUNTER (EMERGENCY)
Facility: HOSPITAL | Age: 34
Discharge: HOME | End: 2024-09-13
Attending: STUDENT IN AN ORGANIZED HEALTH CARE EDUCATION/TRAINING PROGRAM
Payer: COMMERCIAL

## 2024-09-13 ENCOUNTER — CLINICAL SUPPORT (OUTPATIENT)
Dept: EMERGENCY MEDICINE | Facility: HOSPITAL | Age: 34
End: 2024-09-13
Payer: COMMERCIAL

## 2024-09-13 VITALS
DIASTOLIC BLOOD PRESSURE: 116 MMHG | TEMPERATURE: 99 F | WEIGHT: 200 LBS | BODY MASS INDEX: 30.31 KG/M2 | OXYGEN SATURATION: 97 % | SYSTOLIC BLOOD PRESSURE: 169 MMHG | HEART RATE: 108 BPM | RESPIRATION RATE: 18 BRPM | HEIGHT: 68 IN

## 2024-09-13 DIAGNOSIS — R06.02 SHORTNESS OF BREATH: Primary | ICD-10-CM

## 2024-09-13 DIAGNOSIS — B37.0 THRUSH: Primary | ICD-10-CM

## 2024-09-13 LAB
ALBUMIN SERPL BCP-MCNC: 5.1 G/DL (ref 3.4–5)
ANION GAP SERPL CALC-SCNC: 16 MMOL/L (ref 10–20)
BASOPHILS # BLD AUTO: 0.01 X10*3/UL (ref 0–0.1)
BASOPHILS NFR BLD AUTO: 0.1 %
BUN SERPL-MCNC: 14 MG/DL (ref 6–23)
CALCIUM SERPL-MCNC: 10.2 MG/DL (ref 8.6–10.6)
CARDIAC TROPONIN I PNL SERPL HS: <3 NG/L (ref 0–53)
CHLORIDE SERPL-SCNC: 102 MMOL/L (ref 98–107)
CO2 SERPL-SCNC: 24 MMOL/L (ref 21–32)
CREAT SERPL-MCNC: 0.93 MG/DL (ref 0.5–1.3)
EGFRCR SERPLBLD CKD-EPI 2021: >90 ML/MIN/1.73M*2
EOSINOPHIL # BLD AUTO: 0.02 X10*3/UL (ref 0–0.7)
EOSINOPHIL NFR BLD AUTO: 0.2 %
ERYTHROCYTE [DISTWIDTH] IN BLOOD BY AUTOMATED COUNT: 12.8 % (ref 11.5–14.5)
GLUCOSE SERPL-MCNC: 106 MG/DL (ref 74–99)
HCT VFR BLD AUTO: 54.3 % (ref 41–52)
HGB BLD-MCNC: 17.5 G/DL (ref 13.5–17.5)
IMM GRANULOCYTES # BLD AUTO: 0.04 X10*3/UL (ref 0–0.7)
IMM GRANULOCYTES NFR BLD AUTO: 0.4 % (ref 0–0.9)
LYMPHOCYTES # BLD AUTO: 1.42 X10*3/UL (ref 1.2–4.8)
LYMPHOCYTES NFR BLD AUTO: 13.9 %
MCH RBC QN AUTO: 31.3 PG (ref 26–34)
MCHC RBC AUTO-ENTMCNC: 32.2 G/DL (ref 32–36)
MCV RBC AUTO: 97 FL (ref 80–100)
MONOCYTES # BLD AUTO: 0.4 X10*3/UL (ref 0.1–1)
MONOCYTES NFR BLD AUTO: 3.9 %
NEUTROPHILS # BLD AUTO: 8.35 X10*3/UL (ref 1.2–7.7)
NEUTROPHILS NFR BLD AUTO: 81.5 %
NRBC BLD-RTO: 0 /100 WBCS (ref 0–0)
PHOSPHATE SERPL-MCNC: 2.5 MG/DL (ref 2.5–4.9)
PLATELET # BLD AUTO: 213 X10*3/UL (ref 150–450)
POTASSIUM SERPL-SCNC: 4.2 MMOL/L (ref 3.5–5.3)
RBC # BLD AUTO: 5.6 X10*6/UL (ref 4.5–5.9)
SODIUM SERPL-SCNC: 138 MMOL/L (ref 136–145)
WBC # BLD AUTO: 10.2 X10*3/UL (ref 4.4–11.3)

## 2024-09-13 PROCEDURE — 99284 EMERGENCY DEPT VISIT MOD MDM: CPT | Mod: 25

## 2024-09-13 PROCEDURE — 99285 EMERGENCY DEPT VISIT HI MDM: CPT | Performed by: STUDENT IN AN ORGANIZED HEALTH CARE EDUCATION/TRAINING PROGRAM

## 2024-09-13 PROCEDURE — 84484 ASSAY OF TROPONIN QUANT: CPT

## 2024-09-13 PROCEDURE — 84100 ASSAY OF PHOSPHORUS: CPT

## 2024-09-13 PROCEDURE — 96374 THER/PROPH/DIAG INJ IV PUSH: CPT

## 2024-09-13 PROCEDURE — 94640 AIRWAY INHALATION TREATMENT: CPT

## 2024-09-13 PROCEDURE — 85025 COMPLETE CBC W/AUTO DIFF WBC: CPT

## 2024-09-13 PROCEDURE — 36415 COLL VENOUS BLD VENIPUNCTURE: CPT

## 2024-09-13 PROCEDURE — 2500000002 HC RX 250 W HCPCS SELF ADMINISTERED DRUGS (ALT 637 FOR MEDICARE OP, ALT 636 FOR OP/ED)

## 2024-09-13 PROCEDURE — 71046 X-RAY EXAM CHEST 2 VIEWS: CPT

## 2024-09-13 PROCEDURE — 71046 X-RAY EXAM CHEST 2 VIEWS: CPT | Mod: FOREIGN READ | Performed by: RADIOLOGY

## 2024-09-13 PROCEDURE — 2500000001 HC RX 250 WO HCPCS SELF ADMINISTERED DRUGS (ALT 637 FOR MEDICARE OP)

## 2024-09-13 PROCEDURE — 2500000004 HC RX 250 GENERAL PHARMACY W/ HCPCS (ALT 636 FOR OP/ED)

## 2024-09-13 PROCEDURE — 93005 ELECTROCARDIOGRAM TRACING: CPT

## 2024-09-13 RX ORDER — IPRATROPIUM BROMIDE AND ALBUTEROL SULFATE 2.5; .5 MG/3ML; MG/3ML
3 SOLUTION RESPIRATORY (INHALATION) EVERY 20 MIN
Status: COMPLETED | OUTPATIENT
Start: 2024-09-13 | End: 2024-09-13

## 2024-09-13 RX ORDER — NYSTATIN 100000 [USP'U]/ML
500000 SUSPENSION ORAL 4 TIMES DAILY
Qty: 280 ML | Refills: 0 | Status: SHIPPED | OUTPATIENT
Start: 2024-09-13 | End: 2024-11-12

## 2024-09-13 RX ORDER — HYDROXYZINE HYDROCHLORIDE 25 MG/1
12.5 TABLET, FILM COATED ORAL ONCE
Status: COMPLETED | OUTPATIENT
Start: 2024-09-13 | End: 2024-09-13

## 2024-09-13 RX ORDER — NYSTATIN 100000 [USP'U]/ML
4 SUSPENSION ORAL EVERY 6 HOURS
Status: DISCONTINUED | OUTPATIENT
Start: 2024-09-13 | End: 2024-09-13 | Stop reason: HOSPADM

## 2024-09-13 RX ORDER — MAGNESIUM SULFATE HEPTAHYDRATE 40 MG/ML
2 INJECTION, SOLUTION INTRAVENOUS ONCE
Status: COMPLETED | OUTPATIENT
Start: 2024-09-13 | End: 2024-09-13

## 2024-09-13 ASSESSMENT — PAIN SCALES - GENERAL: PAINLEVEL_OUTOF10: 0 - NO PAIN

## 2024-09-13 ASSESSMENT — COLUMBIA-SUICIDE SEVERITY RATING SCALE - C-SSRS
1. IN THE PAST MONTH, HAVE YOU WISHED YOU WERE DEAD OR WISHED YOU COULD GO TO SLEEP AND NOT WAKE UP?: NO
6. HAVE YOU EVER DONE ANYTHING, STARTED TO DO ANYTHING, OR PREPARED TO DO ANYTHING TO END YOUR LIFE?: NO
2. HAVE YOU ACTUALLY HAD ANY THOUGHTS OF KILLING YOURSELF?: NO

## 2024-09-13 ASSESSMENT — PAIN - FUNCTIONAL ASSESSMENT: PAIN_FUNCTIONAL_ASSESSMENT: 0-10

## 2024-09-13 NOTE — ED PROVIDER NOTES
History of Present Illness     History provided by: Patient  Limitations to History: None  External Records Reviewed with Brief Summary: None    HPI:  Jose Zavala is a 34 y.o. male with history of HLD, hypertension, intermittent asthma the presents emergency room for chest tightness.  Patient states that about 10 days ago family tested positive for COVID stated that he had similar symptoms.  He has been having increased chest tightness and shortness of breath.  Patient states that he is taking Breo, Singulair, Zyrtec, Flonase, albuterol and DuoNebs.  Patient has family that it is internal medicine and was given a Z-Eugene that he finished yesterday along with tapers of prednisone.  Currently on 30 prednisone.  Prior to this 4-day taper he was given 10 days of 40 mg of prednisone.  He states that he is very anxious about the chest tightness.  Patient believes that he has been oral thrush in the back of his oropharynx.  She is not complaining of any fevers, chills, abdominal pain, nausea, vomiting.  Patient states that he has been in good health other than the chest tightness.      Physical Exam   Triage vitals:  T 37.2 °C (99 °F)  HR (!) 108  BP (!) 169/116  RR 18  O2 97 % None (Room air)    General: Awake, alert, in no acute distress  Eyes: Gaze conjugate.  No scleral icterus or injection  HENT: Normo-cephalic, atraumatic. No stridor, white thrush seen on the posterior oropharynx, no swelling, no tonsillar adenopathy, no erythema  CV: Tachycardic rate, regular rhythm. Radial pulses 2+ bilaterally  Resp: Breathing non-labored, speaking in full sentences.  Decreased aeration all fields, no wheezing, no stridor  GI: Soft, non-distended, non-tender. No rebound or guarding.  MSK/Extremities: No gross bony deformities. Moving all extremities  Skin: Warm. Appropriate color  Neuro: Alert. Oriented. Face symmetric. Speech is fluent.  Gross strength and sensation intact in b/l UE and LEs  Psych: Appropriate mood and  affect    Medical Decision Making & ED Course   Medical Decision Making:  Jose Zavala is a 34 y.o. male with history of HLD, hypertension, intermittent asthma the presents emergency room for chest tightness.     Patient presents with exacerbation of their known obstructive lung disease. Lung exam does not concern me for a focal process such as pneumonia.  Patient's lung exam and presentation is consistent with obstructive lung disease, my concern for pulmonary embolism is low and CT scan and further lab evaluation for this was not pursued at this time.  Patient does not present in respiratory distress requiring any sort of positive pressure ventilation.  Patient was given   Medications   ipratropium-albuteroL (Duo-Neb) 0.5-2.5 mg/3 mL nebulizer solution 3 mL (3 mL nebulization Given 9/13/24 1648)   nystatin (Mycostatin) 100,000 unit/mL suspension 400,000 Units (400,000 Units Swish & Spit Given 9/13/24 1645)   magnesium sulfate 2 g in sterile water for injection 50 mL (0 g intravenous Stopped 9/13/24 1726)   hydrOXYzine HCL (Atarax) tablet 12.5 mg (12.5 mg oral Given 9/13/24 1645)    with improvement in their symptoms.     Given the improvement, patient was given appropriate return precautions    Although the patient did improve from initial presentation I do not feel that patient is safe for discharge home at this time, they will be admitted to the hospital for further treatment of their obstructive lung disease exacerbation.    Patient was also found to have some thrush on exam in the bilateral oropharynx.  He was given nystatin swish and spit.  This emergency room in the emergency room.  Patient satting 97% on room air.  Told to return to emergency room if any worsening symptoms.    ----  Scoring Tools Utilized: None       Social Determinants of Health which Significantly Impact Care: None identified The following actions were taken to address these social determinants: None    EKG Independent Interpretation:  EKG interpreted by myself. Please see ED Course for full interpretation.    Independent Result Review and Interpretation: Relevant laboratory and radiographic results were reviewed and independently interpreted by myself.  As necessary, they are commented on in the ED Course.    Chronic conditions affecting the patient's care: As documented above in [facility]    The patient was discussed with the following consultants/services: None    Care Considerations: As documented above in [facility]    ED Course:  ED Course as of 09/15/24 0657   Fri Sep 13, 2024   1756 CBC, CMP is within normal limits. [YG]   1757 Patient given nystatin swish and spit. [YG]   1757 Patient given hydroxyzine 12.5 mg, 2 doses of DuoNebs, magnesium sulfate 2 g [YG]   1757  2 doses of DuoNebs, magnesium sulfate 2 g [YG]   1829 X-ray shows no acute cardiopulmonary process.  [YG]      ED Course User Index  [YG] Ria Love MD         Diagnoses as of 09/15/24 0657   Shortness of breath     Disposition   As a result of the work-up, the patient was discharged home.  he was informed of his diagnosis and instructed to come back with any concerns or worsening of condition.  he and was agreeable to the plan as discussed above.  he was given the opportunity to ask questions.  All of the patient's questions were answered.    Procedures   Procedures    Patient seen and discussed with ED attending physician.    Ria Love MD  Emergency Medicine     Ria Love MD  Resident  09/15/24 0702

## 2024-09-13 NOTE — ED TRIAGE NOTES
Pt with history of Asthma presents with SOB. Pt states recent exposure to covid, recent prednisone pack, increased used of inhalers and nebulizers, seen at urgent care and sent to ED. Pt also concerned for thrush, denies any pain

## 2024-09-14 LAB
ATRIAL RATE: 99 BPM
P AXIS: 53 DEGREES
P OFFSET: 198 MS
P ONSET: 148 MS
PR INTERVAL: 130 MS
Q ONSET: 213 MS
QRS COUNT: 17 BEATS
QRS DURATION: 96 MS
QT INTERVAL: 338 MS
QTC CALCULATION(BAZETT): 433 MS
QTC FREDERICIA: 399 MS
R AXIS: 101 DEGREES
T AXIS: 40 DEGREES
T OFFSET: 382 MS
VENTRICULAR RATE: 99 BPM

## 2024-09-14 PROCEDURE — 93010 ELECTROCARDIOGRAM REPORT: CPT

## 2024-09-14 NOTE — DISCHARGE INSTRUCTIONS
Labs and imaging look normal. Follow-up with your pulmonologist outpatient.  Please return to emergency room if you have any new or worsening symptoms.

## 2024-09-18 NOTE — PROGRESS NOTES
Department of Medicine I Division of Pulmonary, Critical Care, and Sleep Medicine   [id number] [place] [address] [address] [place] [address]  Phone: [phone]  Fax: [phone]    History of Present Illness   Mr. Zavala is a 34-year-old male with past medical history of asthma presenting as a follow up.     Pulmonary history:   Usually seen by Dr. Avila in clinic. Diagnosis of asthma based on clinical phenotype, obstruction with incomplete BD response on PFTs. At the time, FeNO of 19, no elevated AEC. Has been on Breo Elipta (ICS/LABA) and Montelukast as his maintenance regimen and has been doing very well until recently- last seen in clinic in 12/2023 at which point in time his symptoms were very well controlled.     About 14 days ago, he started having symptoms of shortness of breath and chest tightness- this was following his wife testing positive for COVID a few days prior to that. Given these symptoms, he was started on azithromycin and a slow taper of prednisone- he was seen at an urgent care where an X ray was unrevealing, and then at an ED after symptoms did not resolve. In the ED, he was given duo nebs, ACS was ruled out, and he was sent home feeling better, continued on his oral steroid course and maintenance medications. He completed a total of 12 days of prednisone without significant relief, in addition to round the clock Duo Nebs. Last dose of prednisone was over the weekend.    Today, he reports continued intermittent chest tightness; PEFR was 450 prior to a treatment and 550 thereafter (550 is about baseline for him). MMRC today of 1. ACT of 10. Albuterol/DuoNeb use of 4 times per day. Good compliance with maintenance regimen. No fevers, no chills, no night sweats, no loss of weight. Some uncontrolled GERD symptoms, some sinus issues- takes treatment for both. No difficulty swallowing or change in voice. Sick contact was his wife as above.     Review of Systems  Review of Systems    Constitutional:  Positive for activity change.   HENT:  Positive for congestion.    Eyes:  Negative for discharge.   Respiratory:  Positive for chest tightness.    Cardiovascular:  Negative for chest pain.   Gastrointestinal:  Negative for abdominal distention.   Endocrine: Negative for cold intolerance.   Genitourinary:  Negative for difficulty urinating.   Musculoskeletal:  Negative for back pain.   Skin:  Negative for rash.   Allergic/Immunologic: Positive for environmental allergies.   Neurological:  Positive for headaches.   Hematological:  Does not bruise/bleed easily.   Psychiatric/Behavioral:  Negative for confusion.          Past Medical History   No past medical history on file.      Immunizations     Immunization History   Administered Date(s) Administered    Flu vaccine (IIV4), preservative free *Check age/dose* 09/24/2021, 10/29/2022    Hepatitis B vaccine, 19 yrs and under (RECOMBIVAX, ENGERIX) 10/12/2012, 12/03/2012    Influenza, Unspecified 01/02/2001, 10/13/2023    Influenza, injectable, quadrivalent 02/25/2019, 03/11/2020    Meningococcal ACWY-D (Menactra) 4-valent conjugate vaccine 04/06/2007    Moderna SARS-CoV-2 Vaccination 12/30/2020, 01/27/2021, 12/21/2021    Tdap vaccine, age 7 year and older (BOOSTRIX, ADACEL) 12/03/2012, 10/04/2016, 03/09/2021    Varicella vaccine, subcutaneous (VARIVAX) 03/28/1996, 10/31/2011       Medications and Allergies     Current Outpatient Medications   Medication Instructions    albuterol 90 mcg/actuation inhaler 2 puffs, inhalation, Every 6 hours PRN    amLODIPine-benazepriL (LotreL) 5-10 mg capsule 1 capsule, oral, Daily    atorvastatin (LIPITOR) 10 mg, oral, Daily    busPIRone (BUSPAR) 10 mg, oral, 3 times daily    famotidine (PEPCID) 20 mg, oral, Daily    fluticasone (Flonase) 50 mcg/actuation nasal spray 1 spray, Each Nostril, Daily, Shake gently. Before first use, prime pump. After use, clean tip and replace cap.    fluticasone (Flonase) 50 mcg/actuation  "nasal spray 1 spray, Each Nostril, Daily RT    fluticasone furoate-vilanteroL (Breo Ellipta) 200-25 mcg/dose inhaler INHALE 1 PUFF BY MOUTH ONCE DAILY    ipratropium-albuteroL (Duo-Neb) 0.5-2.5 mg/3 mL nebulizer solution 3 mL, nebulization, Every 6 hours PRN    montelukast (SINGULAIR) 10 mg, oral, Nightly    nystatin (MYCOSTATIN) 500,000 Units, oral, 4 times daily, Swish in mouth and spit out.    predniSONE (Deltasone) 10 mg tablet Take 4 tablets (40 mg) by mouth once daily for 2 days, THEN 3 tablets (30 mg) once daily for 2 days, THEN 2 tablets (20 mg) once daily for 2 days, THEN 1 tablet (10 mg) once daily for 2 days.    sertraline (ZOLOFT) 100 mg, oral, Daily        Allergies:  Patient has no known allergies.    Social History   He reports that he has quit smoking. His smoking use included cigarettes. He has been exposed to tobacco smoke. He has never used smokeless tobacco. He reports that he does not currently use alcohol. He reports that he does not use drugs.    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      Surgical History   He has a past surgical history that includes Tympanostomy tube placement (09/20/2016) and Mouth surgery (09/20/2016).    Physical Exam         6/30/2023    10:49 AM 10/9/2023     5:06 PM 11/3/2023     6:54 PM 11/7/2023     3:19 PM 5/7/2024    11:19 AM 9/10/2024    10:44 AM 9/13/2024     3:40 PM   Vitals   Systolic 140 141 144 148 116 154 169   Diastolic 88 95 91 90 74 108 116   Heart Rate 74 81 85 78 94 82 108   Temp  36.4 °C (97.5 °F)    36.6 °C (97.8 °F) 37.2 °C (99 °F)   Resp  18 16   19 18   Height (in)  1.727 m (5' 8\")  1.727 m (5' 8\") 1.727 m (5' 8\")  1.727 m (5' 8\")   Weight (lb) 206.4 205.91 205.03 209 204 205 200   BMI 31.38 kg/m2 31.31 kg/m2 31.17 kg/m2 31.78 kg/m2 31.02 kg/m2 31.17 kg/m2 30.41 kg/m2   BSA (m2) 2.12 m2 2.12 m2 2.11 m2 2.13 m2 2.11 m2 " 2.11 m2 2.09 m2   Visit Report    Report Report Report         Physical Exam  General: Awake and alert. In no acute distress.   Eyes: PERRL. Clear sclera.  ENT: Neck supple. Mucus membranes moist.  Neck: Trachea midline. No JVD.   Respiratory: Equal air entry bilaterally. No added sounds.  Cardiovascular: Regular rate and rhythm. S1S2 heard.  Gastrointestinal: Non-distended in appearance.  Neurological: Awake and alert.   Skin: Warm and dry. No rash.  Extremities: No pedal edema.    Results   Pulmonary Function Tests:  PFTs 12/2022: FEV1/FVC 0.65, FEV1 - 2.93L, 70% of predicted, partial BD response (volume criteria met, % criteria was not), FeNo 19, the TLC, RV, and DLCO were all >LLN     Chest Radiograph:  XR chest 2 views 09/13/2024    Cardiomediastinal silhouette is normal in size and configuration.    LUNGS:  Lungs are clear. No effusions or pneumothorax.    ABDOMEN:  No remarkable upper abdominal findings.    BONES:  No acute osseous changes. Degenerative changes in the thoracic spine.    Impression  No acute abnormalities  Signed by Beau Reynolds MD      Chest CT Scan:  None available       ECHO:  None available       Labs:  Lab Results   Component Value Date    WBC 10.2 09/13/2024    HGB 17.5 09/13/2024    HCT 54.3 (H) 09/13/2024    MCV 97 09/13/2024     09/13/2024       Lab Results   Component Value Date    CREATININE 0.93 09/13/2024    BUN 14 09/13/2024     09/13/2024    K 4.2 09/13/2024     09/13/2024    CO2 24 09/13/2024          AEC:   Eosinophils Absolute (x10*3/uL)   Date Value   09/13/2024 0.02     Eosinophils % (%)   Date Value   09/13/2024 0.2          Assessment and Plan   Mr. Zavala is a 34-year-old male with past medical history of asthma presenting as a follow up.     #Asthma- uncontrolled:   Obstruction on PFTs with an incomplete BD response; no elevated AEC at any point. FeNO of 19 when done last. MMRC of 1. ACT of 10. Maintenance therapy with Breo Ellipta (ICS/LABA),  Montelukast, as needed Albuterol (ABIGAIL)/Suo Nebs (ABIGAIL/JUANITA). As above, has been uncontrolled over the last 2-3 weeks and has needed to be on PO steroids without significant relief, concerning for alternative etiologies of his symptoms.    - Obtain CT chest PE protocol to rule out clots, other parenchymal pathologies  - Continue maintenance therapy with ICS/LABA (Breo Elipta), Montelukast  - Add Spiriva (LAMA) to maintenance regimen  - Add Budesonide (ICS) nebulizer for now  - Start PRN Albuterol (ABIGAIL) nebulizer  - Prescribed a taper of prednisone if symptoms worsen/remain unchanged and no pathology on CT to explain this  - Obtain allergy panel  - RTC in 4 weeks or earlier if symptoms worsen    Patient seen and discussed with Dr. Hall.     Leticia Potts MD  Fellow  Pulmonary and Critical Care

## 2024-09-19 ENCOUNTER — OFFICE VISIT (OUTPATIENT)
Dept: PULMONOLOGY | Facility: HOSPITAL | Age: 34
End: 2024-09-19
Payer: COMMERCIAL

## 2024-09-19 ENCOUNTER — LAB (OUTPATIENT)
Dept: LAB | Facility: LAB | Age: 34
End: 2024-09-19
Payer: COMMERCIAL

## 2024-09-19 VITALS
BODY MASS INDEX: 31.63 KG/M2 | TEMPERATURE: 96.1 F | WEIGHT: 208 LBS | HEART RATE: 88 BPM | DIASTOLIC BLOOD PRESSURE: 103 MMHG | SYSTOLIC BLOOD PRESSURE: 143 MMHG | OXYGEN SATURATION: 97 %

## 2024-09-19 DIAGNOSIS — J45.909 ASTHMA, UNSPECIFIED ASTHMA SEVERITY, UNSPECIFIED WHETHER COMPLICATED, UNSPECIFIED WHETHER PERSISTENT (HHS-HCC): Primary | ICD-10-CM

## 2024-09-19 DIAGNOSIS — J45.909 ASTHMA, UNSPECIFIED ASTHMA SEVERITY, UNSPECIFIED WHETHER COMPLICATED, UNSPECIFIED WHETHER PERSISTENT (HHS-HCC): ICD-10-CM

## 2024-09-19 DIAGNOSIS — R06.02 SHORTNESS OF BREATH: ICD-10-CM

## 2024-09-19 PROCEDURE — 99213 OFFICE O/P EST LOW 20 MIN: CPT | Performed by: STUDENT IN AN ORGANIZED HEALTH CARE EDUCATION/TRAINING PROGRAM

## 2024-09-19 PROCEDURE — 99213 OFFICE O/P EST LOW 20 MIN: CPT | Mod: GC | Performed by: STUDENT IN AN ORGANIZED HEALTH CARE EDUCATION/TRAINING PROGRAM

## 2024-09-19 PROCEDURE — 86003 ALLG SPEC IGE CRUDE XTRC EA: CPT

## 2024-09-19 PROCEDURE — 82785 ASSAY OF IGE: CPT

## 2024-09-19 PROCEDURE — 3080F DIAST BP >= 90 MM HG: CPT | Performed by: STUDENT IN AN ORGANIZED HEALTH CARE EDUCATION/TRAINING PROGRAM

## 2024-09-19 PROCEDURE — RXMED WILLOW AMBULATORY MEDICATION CHARGE

## 2024-09-19 PROCEDURE — 36415 COLL VENOUS BLD VENIPUNCTURE: CPT

## 2024-09-19 PROCEDURE — 3077F SYST BP >= 140 MM HG: CPT | Performed by: STUDENT IN AN ORGANIZED HEALTH CARE EDUCATION/TRAINING PROGRAM

## 2024-09-19 RX ORDER — BUDESONIDE 0.5 MG/2ML
0.5 INHALANT ORAL 2 TIMES DAILY
Qty: 60 ML | Refills: 11 | Status: SHIPPED | OUTPATIENT
Start: 2024-09-19 | End: 2025-09-19

## 2024-09-19 RX ORDER — PREDNISONE 10 MG/1
TABLET ORAL
Qty: 30 TABLET | Refills: 0 | Status: SHIPPED | OUTPATIENT
Start: 2024-09-19

## 2024-09-19 RX ORDER — TIOTROPIUM BROMIDE INHALATION SPRAY 3.12 UG/1
2 SPRAY, METERED RESPIRATORY (INHALATION) DAILY
Qty: 8 G | Refills: 11 | Status: SHIPPED | OUTPATIENT
Start: 2024-09-19 | End: 2024-09-19 | Stop reason: CLARIF

## 2024-09-19 RX ORDER — UMECLIDINIUM 62.5 UG/1
1 AEROSOL, POWDER ORAL DAILY
Qty: 1 EACH | Refills: 5 | Status: SHIPPED | OUTPATIENT
Start: 2024-09-19

## 2024-09-19 RX ORDER — ALBUTEROL SULFATE 1.25 MG/3ML
1.25 SOLUTION RESPIRATORY (INHALATION) EVERY 6 HOURS PRN
Qty: 75 ML | Refills: 11 | Status: SHIPPED | OUTPATIENT
Start: 2024-09-19 | End: 2025-09-19

## 2024-09-19 ASSESSMENT — ENCOUNTER SYMPTOMS
BRUISES/BLEEDS EASILY: 0
HEADACHES: 1
DIFFICULTY URINATING: 0
EYE DISCHARGE: 0
ABDOMINAL DISTENTION: 0
ACTIVITY CHANGE: 1
BACK PAIN: 0
CONFUSION: 0
CHEST TIGHTNESS: 1

## 2024-09-19 ASSESSMENT — LIFESTYLE VARIABLES
HOW OFTEN DO YOU HAVE A DRINK CONTAINING ALCOHOL: MONTHLY OR LESS
HOW OFTEN DO YOU HAVE SIX OR MORE DRINKS ON ONE OCCASION: LESS THAN MONTHLY
AUDIT-C TOTAL SCORE: 2
HOW MANY STANDARD DRINKS CONTAINING ALCOHOL DO YOU HAVE ON A TYPICAL DAY: PATIENT DOES NOT DRINK
SKIP TO QUESTIONS 9-10: 0

## 2024-09-19 ASSESSMENT — PAIN SCALES - GENERAL: PAINLEVEL: 0-NO PAIN

## 2024-09-19 NOTE — PATIENT INSTRUCTIONS
Thank you for coming into the clinic today. It was a pleasure to see you!     Today we discussed the following:   - Your asthma and persistent shortness of breath    The plan going forward is as follows:   Continue the following medications:   - Breo, montelukast  -will add spiriva and switch to albuterol nebulizer   -steroid taper just in case symptoms are not improving     Perform the following diagnostic tests:   - CT (PE protocol)    Please follow up with me in: 4 weeks or sooner if needed    If you have any further questions feel free to call my office at [phone] (choose #2 to reach a pulmonary nurse) and please allow 1-2 business days for a response.     If you have any scheduling needs feel free to call [phone] (for breathing or walking test), [phone] (for EKG's, echocardiograms or cardiopulmonary stress test), and [phone] (for radiology tests such as CT scan, MRIs and nuclear medicine tests).    Leticia Potts  Pulmonary and Critical Care Fellow     [address] [place] [address]

## 2024-09-21 LAB
A ALTERNATA IGE QN: 4.78 KU/L
A FUMIGATUS IGE QN: 1.64 KU/L
BERMUDA GRASS IGE QN: 0.12 KU/L
BOXELDER IGE QN: <0.1 KU/L
C HERBARUM IGE QN: 1.09 KU/L
CALIF WALNUT POLN IGE QN: 0.4 KU/L
CAT DANDER IGE QN: <0.1 KU/L
CMN PIGWEED IGE QN: <0.1 KU/L
COMMON RAGWEED IGE QN: 0.27 KU/L
COTTONWOOD IGE QN: 0.16 KU/L
D FARINAE IGE QN: 0.13 KU/L
D PTERONYSS IGE QN: 0.18 KU/L
DOG DANDER IGE QN: 0.49 KU/L
ENGL PLANTAIN IGE QN: 0.15 KU/L
GOOSEFOOT IGE QN: 0.16 KU/L
JOHNSON GRASS IGE QN: 0.13 KU/L
KENT BLUE GRASS IGE QN: 0.22 KU/L
LONDON PLANE IGE QN: 0.18 KU/L
MT JUNIPER IGE QN: 0.17 KU/L
P NOTATUM IGE QN: 0.48 KU/L
PECAN/HICK TREE IGE QN: <0.1 KU/L
ROACH IGE QN: <0.1 KU/L
SALTWORT IGE QN: 0.13 KU/L
SHEEP SORREL IGE QN: 0.1 KU/L
SILVER BIRCH IGE QN: 0.15 KU/L
TIMOTHY IGE QN: 0.15 KU/L
TOTAL IGE SMQN RAST: 148 KU/L
WHITE ASH IGE QN: 0.68 KU/L
WHITE ELM IGE QN: 0.29 KU/L
WHITE MULBERRY IGE QN: <0.1 KU/L
WHITE OAK IGE QN: 0.15 KU/L

## 2024-09-22 ENCOUNTER — PHARMACY VISIT (OUTPATIENT)
Dept: PHARMACY | Facility: CLINIC | Age: 34
End: 2024-09-22
Payer: COMMERCIAL

## 2024-09-22 PROCEDURE — RXOTC WILLOW AMBULATORY OTC CHARGE

## 2024-09-23 ENCOUNTER — HOSPITAL ENCOUNTER (OUTPATIENT)
Dept: RADIOLOGY | Facility: CLINIC | Age: 34
Discharge: HOME | End: 2024-09-23
Payer: COMMERCIAL

## 2024-09-23 DIAGNOSIS — R06.02 SHORTNESS OF BREATH: ICD-10-CM

## 2024-09-23 DIAGNOSIS — J45.909 ASTHMA, UNSPECIFIED ASTHMA SEVERITY, UNSPECIFIED WHETHER COMPLICATED, UNSPECIFIED WHETHER PERSISTENT (HHS-HCC): ICD-10-CM

## 2024-09-23 PROCEDURE — 71275 CT ANGIOGRAPHY CHEST: CPT

## 2024-09-23 PROCEDURE — 2550000001 HC RX 255 CONTRASTS: Performed by: STUDENT IN AN ORGANIZED HEALTH CARE EDUCATION/TRAINING PROGRAM

## 2024-09-23 PROCEDURE — 71275 CT ANGIOGRAPHY CHEST: CPT | Performed by: RADIOLOGY

## 2024-09-30 PROCEDURE — RXMED WILLOW AMBULATORY MEDICATION CHARGE

## 2024-10-04 ENCOUNTER — PHARMACY VISIT (OUTPATIENT)
Dept: PHARMACY | Facility: CLINIC | Age: 34
End: 2024-10-04
Payer: COMMERCIAL

## 2024-10-17 ENCOUNTER — APPOINTMENT (OUTPATIENT)
Dept: PRIMARY CARE | Facility: CLINIC | Age: 34
End: 2024-10-17
Payer: COMMERCIAL

## 2024-10-17 VITALS
TEMPERATURE: 97.6 F | HEIGHT: 68 IN | SYSTOLIC BLOOD PRESSURE: 116 MMHG | WEIGHT: 206 LBS | OXYGEN SATURATION: 96 % | BODY MASS INDEX: 31.22 KG/M2 | HEART RATE: 77 BPM | DIASTOLIC BLOOD PRESSURE: 74 MMHG

## 2024-10-17 DIAGNOSIS — E03.9 ACQUIRED HYPOTHYROIDISM: ICD-10-CM

## 2024-10-17 DIAGNOSIS — E78.2 MIXED HYPERLIPIDEMIA: ICD-10-CM

## 2024-10-17 DIAGNOSIS — J45.50: ICD-10-CM

## 2024-10-17 DIAGNOSIS — K21.9 GASTROESOPHAGEAL REFLUX DISEASE WITHOUT ESOPHAGITIS: ICD-10-CM

## 2024-10-17 DIAGNOSIS — I10 PRIMARY HYPERTENSION: Primary | ICD-10-CM

## 2024-10-17 PROCEDURE — 3074F SYST BP LT 130 MM HG: CPT | Performed by: INTERNAL MEDICINE

## 2024-10-17 PROCEDURE — 3008F BODY MASS INDEX DOCD: CPT | Performed by: INTERNAL MEDICINE

## 2024-10-17 PROCEDURE — 3078F DIAST BP <80 MM HG: CPT | Performed by: INTERNAL MEDICINE

## 2024-10-17 PROCEDURE — 90471 IMMUNIZATION ADMIN: CPT | Performed by: INTERNAL MEDICINE

## 2024-10-17 PROCEDURE — 90656 IIV3 VACC NO PRSV 0.5 ML IM: CPT | Performed by: INTERNAL MEDICINE

## 2024-10-17 PROCEDURE — 99213 OFFICE O/P EST LOW 20 MIN: CPT | Performed by: INTERNAL MEDICINE

## 2024-10-17 NOTE — PROGRESS NOTES
"Subjective   Patient ID: Jose Zavala is a 34 y.o. male who presents for Asthma.    HPI had a bad asthma flare   Was exposed to covid    Finally now controlled   Sees pulm and allergy     Review of Systems  Brief ROS were reviewed see pertinent in HPI   Objective   /74 (BP Location: Left arm, Patient Position: Sitting, BP Cuff Size: Adult)   Pulse 77   Temp 36.4 °C (97.6 °F) (Temporal)   Ht 1.727 m (5' 8\")   Wt 93.4 kg (206 lb)   SpO2 96%   BMI 31.32 kg/m²     Physical Exam  NAD  CARD RRR  PULM CTA  ABD NEG   EXT  NL    Assessment/Plan   Diagnoses and all orders for this visit:  Primary hypertension  Comments:  at goal  Mixed hyperlipidemia  Acquired hypothyroidism  Asthma, well controlled, severe persistent (Multi)  Comments:  was quite out of control now better  Gastroesophageal reflux disease without esophagitis  3       "

## 2024-10-24 ENCOUNTER — TELEPHONE (OUTPATIENT)
Dept: GASTROENTEROLOGY | Facility: CLINIC | Age: 34
End: 2024-10-24
Payer: COMMERCIAL

## 2024-10-27 DIAGNOSIS — I10 HYPERTENSION, UNSPECIFIED TYPE: ICD-10-CM

## 2024-10-28 PROCEDURE — RXMED WILLOW AMBULATORY MEDICATION CHARGE

## 2024-10-28 RX ORDER — AMLODIPINE AND BENAZEPRIL HYDROCHLORIDE 5; 10 MG/1; MG/1
1 CAPSULE ORAL DAILY
Qty: 90 CAPSULE | Refills: 1 | Status: SHIPPED | OUTPATIENT
Start: 2024-10-28 | End: 2025-10-28

## 2024-10-31 ENCOUNTER — TELEPHONE (OUTPATIENT)
Dept: PULMONOLOGY | Facility: HOSPITAL | Age: 34
End: 2024-10-31

## 2024-10-31 ENCOUNTER — APPOINTMENT (OUTPATIENT)
Dept: GASTROENTEROLOGY | Facility: CLINIC | Age: 34
End: 2024-10-31
Payer: COMMERCIAL

## 2024-10-31 PROCEDURE — RXMED WILLOW AMBULATORY MEDICATION CHARGE

## 2024-11-01 ENCOUNTER — PHARMACY VISIT (OUTPATIENT)
Dept: PHARMACY | Facility: CLINIC | Age: 34
End: 2024-11-01
Payer: COMMERCIAL

## 2024-11-07 ENCOUNTER — PHARMACY VISIT (OUTPATIENT)
Dept: PHARMACY | Facility: CLINIC | Age: 34
End: 2024-11-07
Payer: COMMERCIAL

## 2024-11-07 ENCOUNTER — E-VISIT (OUTPATIENT)
Dept: PRIMARY CARE | Facility: CLINIC | Age: 34
End: 2024-11-07
Payer: COMMERCIAL

## 2024-11-07 DIAGNOSIS — B37.0 THRUSH: Primary | ICD-10-CM

## 2024-11-07 PROCEDURE — RXMED WILLOW AMBULATORY MEDICATION CHARGE

## 2024-11-07 RX ORDER — CLOTRIMAZOLE 10 MG/1
10 LOZENGE ORAL
Qty: 50 TROCHE | Refills: 3 | Status: SHIPPED | OUTPATIENT
Start: 2024-11-07 | End: 2024-11-17

## 2024-11-10 PROCEDURE — RXMED WILLOW AMBULATORY MEDICATION CHARGE

## 2024-11-14 ENCOUNTER — APPOINTMENT (OUTPATIENT)
Dept: PRIMARY CARE | Facility: CLINIC | Age: 34
End: 2024-11-14
Payer: COMMERCIAL

## 2024-11-16 PROCEDURE — RXMED WILLOW AMBULATORY MEDICATION CHARGE

## 2024-11-17 ENCOUNTER — PHARMACY VISIT (OUTPATIENT)
Dept: PHARMACY | Facility: CLINIC | Age: 34
End: 2024-11-17
Payer: COMMERCIAL

## 2024-11-21 DIAGNOSIS — J45.909 ASTHMA, UNSPECIFIED ASTHMA SEVERITY, UNSPECIFIED WHETHER COMPLICATED, UNSPECIFIED WHETHER PERSISTENT (HHS-HCC): Primary | ICD-10-CM

## 2024-11-21 RX ORDER — FLUTICASONE FUROATE, UMECLIDINIUM BROMIDE AND VILANTEROL TRIFENATATE 200; 62.5; 25 UG/1; UG/1; UG/1
1 POWDER RESPIRATORY (INHALATION) DAILY
Qty: 1 EACH | Refills: 1 | Status: SHIPPED | OUTPATIENT
Start: 2024-11-21

## 2024-11-21 NOTE — PROGRESS NOTES
Patient called inquiring about switching his breo and incruse to trelegy--will send prescription in for trelegy   Also discussed stopping pulmicort as symptoms are better controlled  Has follow up with Dr. Potts next months

## 2024-11-22 ENCOUNTER — OFFICE VISIT (OUTPATIENT)
Dept: URGENT CARE | Age: 34
End: 2024-11-22
Payer: COMMERCIAL

## 2024-11-22 VITALS
OXYGEN SATURATION: 97 % | SYSTOLIC BLOOD PRESSURE: 127 MMHG | TEMPERATURE: 97.6 F | HEART RATE: 73 BPM | DIASTOLIC BLOOD PRESSURE: 71 MMHG | RESPIRATION RATE: 20 BRPM

## 2024-11-22 DIAGNOSIS — H60.391 ACUTE INFECTIVE OTITIS EXTERNA, RIGHT: Primary | ICD-10-CM

## 2024-11-22 RX ORDER — CIPROFLOXACIN AND DEXAMETHASONE 3; 1 MG/ML; MG/ML
4 SUSPENSION/ DROPS AURICULAR (OTIC) 2 TIMES DAILY
Qty: 7.5 ML | Refills: 0 | Status: SHIPPED | OUTPATIENT
Start: 2024-11-22 | End: 2024-11-29

## 2024-11-22 ASSESSMENT — ENCOUNTER SYMPTOMS
CHILLS: 0
FEVER: 0

## 2024-11-23 NOTE — PROGRESS NOTES
Subjective   Patient ID: Jose Zavala is a 34 y.o. male. They present today with a chief complaint of Earache ((Rt ear) x 1 1/2 wks).    History of Present Illness    Earache         Patient presents urgent care for complaints of right ear pain for the past 1-1/2 weeks.  Patient states that he irrigated his right ear and reports that he did have some wax come out of his ear.  He still is reporting intermittent pain.  Patient has a history of seasonal allergies and is on Flonase and Zyrtec daily.  Past Medical History  Allergies as of 11/22/2024    (No Known Allergies)       (Not in a hospital admission)       No past medical history on file.    Past Surgical History:   Procedure Laterality Date    MOUTH SURGERY  09/20/2016    Oral Surgery Tooth Extraction    TYMPANOSTOMY TUBE PLACEMENT  09/20/2016    Ear Pressure Equalization Tube, Insertion, Bilaterally        reports that he has quit smoking. His smoking use included cigarettes. He has been exposed to tobacco smoke. He has never used smokeless tobacco. He reports that he does not currently use alcohol. He reports that he does not use drugs.    Review of Systems  Review of Systems   Constitutional:  Negative for chills and fever.   HENT:  Positive for ear pain and tinnitus.                                   Objective    Vitals:    11/22/24 1927   BP: 127/71   Pulse: 73   Resp: 20   Temp: 36.4 °C (97.6 °F)   TempSrc: Temporal   SpO2: 97%     No LMP for male patient.    Physical Exam  Vitals reviewed.   Constitutional:       Appearance: Normal appearance.   HENT:      Head: Normocephalic.      Right Ear: Tympanic membrane normal. Swelling and tenderness present.      Left Ear: Tympanic membrane, ear canal and external ear normal.   Cardiovascular:      Rate and Rhythm: Normal rate and regular rhythm.      Heart sounds: Normal heart sounds.   Pulmonary:      Effort: Pulmonary effort is normal.      Breath sounds: Normal breath sounds.   Skin:     General: Skin is  warm and dry.   Neurological:      Mental Status: He is alert.         Procedures    Point of Care Test & Imaging Results from this visit  No results found for this visit on 11/22/24.   No results found.    Diagnostic study results (if any) were reviewed by KITTY Rachel.    Assessment/Plan   Allergies, medications, history, and pertinent labs/EKGs/Imaging reviewed by KITTY Rachel.     Medical Decision Making  Will start patient on Ciprodex for acute right otitis externa.  Patient was told he may take Tylenol or ibuprofen as needed for pain.  If your symptoms are not improving or worsening in the next 4 to 5 days recommend following up with PCP or return to urgent care if needed.    Orders and Diagnoses  Diagnoses and all orders for this visit:  Acute infective otitis externa, right  -     ciprofloxacin-dexamethasone (Ciprodex) otic suspension; Administer 4 drops into the right ear 2 times a day for 7 days.      Medical Admin Record      Patient disposition: Home    Electronically signed by KITTY Rachel  7:38 PM

## 2024-12-03 PROCEDURE — 88305 TISSUE EXAM BY PATHOLOGIST: CPT | Performed by: DERMATOLOGY

## 2024-12-03 PROCEDURE — 88342 IMHCHEM/IMCYTCHM 1ST ANTB: CPT | Performed by: DERMATOLOGY

## 2024-12-03 PROCEDURE — 88341 IMHCHEM/IMCYTCHM EA ADD ANTB: CPT | Performed by: DERMATOLOGY

## 2024-12-05 ENCOUNTER — LAB REQUISITION (OUTPATIENT)
Dept: DERMATOPATHOLOGY | Facility: CLINIC | Age: 34
End: 2024-12-05
Payer: COMMERCIAL

## 2024-12-05 DIAGNOSIS — D48.5 NEOPLASM OF UNCERTAIN BEHAVIOR OF SKIN: ICD-10-CM

## 2024-12-06 DIAGNOSIS — B37.0 THRUSH, ORAL: Primary | ICD-10-CM

## 2024-12-10 ENCOUNTER — OFFICE VISIT (OUTPATIENT)
Dept: URGENT CARE | Age: 34
End: 2024-12-10
Payer: COMMERCIAL

## 2024-12-10 VITALS
OXYGEN SATURATION: 98 % | HEART RATE: 78 BPM | TEMPERATURE: 97.8 F | SYSTOLIC BLOOD PRESSURE: 155 MMHG | RESPIRATION RATE: 16 BRPM | DIASTOLIC BLOOD PRESSURE: 94 MMHG

## 2024-12-10 DIAGNOSIS — I10 HYPERTENSION, UNSPECIFIED TYPE: ICD-10-CM

## 2024-12-10 DIAGNOSIS — H92.01 RIGHT EAR PAIN: Primary | ICD-10-CM

## 2024-12-10 DIAGNOSIS — J02.9 SORE THROAT: ICD-10-CM

## 2024-12-10 DIAGNOSIS — E78.2 MIXED HYPERLIPIDEMIA: ICD-10-CM

## 2024-12-10 LAB
LAB AP ASR DISCLAIMER: NORMAL
LABORATORY COMMENT REPORT: NORMAL
PATH REPORT.FINAL DX SPEC: NORMAL
PATH REPORT.GROSS SPEC: NORMAL
PATH REPORT.MICROSCOPIC SPEC OTHER STN: NORMAL
PATH REPORT.RELEVANT HX SPEC: NORMAL
PATH REPORT.TOTAL CANCER: NORMAL
POC RAPID MONO: NEGATIVE
POC RAPID STREP: NEGATIVE

## 2024-12-10 PROCEDURE — 99213 OFFICE O/P EST LOW 20 MIN: CPT

## 2024-12-10 PROCEDURE — 86308 HETEROPHILE ANTIBODY SCREEN: CPT

## 2024-12-10 PROCEDURE — 3080F DIAST BP >= 90 MM HG: CPT

## 2024-12-10 PROCEDURE — RXMED WILLOW AMBULATORY MEDICATION CHARGE

## 2024-12-10 PROCEDURE — 87880 STREP A ASSAY W/OPTIC: CPT

## 2024-12-10 PROCEDURE — 3077F SYST BP >= 140 MM HG: CPT

## 2024-12-10 RX ORDER — FLUCONAZOLE 150 MG/1
TABLET ORAL
Qty: 2 TABLET | Refills: 0 | Status: SHIPPED | OUTPATIENT
Start: 2024-12-10

## 2024-12-10 RX ORDER — OFLOXACIN 3 MG/ML
5 SOLUTION AURICULAR (OTIC) DAILY
Qty: 5 ML | Refills: 0 | Status: SHIPPED | OUTPATIENT
Start: 2024-12-10 | End: 2024-12-17

## 2024-12-10 RX ORDER — ATORVASTATIN CALCIUM 10 MG/1
10 TABLET, FILM COATED ORAL DAILY
Qty: 30 TABLET | Refills: 5 | Status: SHIPPED | OUTPATIENT
Start: 2024-12-10 | End: 2025-06-08

## 2024-12-10 ASSESSMENT — PAIN SCALES - GENERAL: PAINLEVEL_OUTOF10: 3

## 2024-12-11 NOTE — PROGRESS NOTES
Subjective   History of Present Illness: Jose Zavala is a 34 y.o. male. They present today with a chief complaint of Earache (Right earache X 10 days. Recently treated for otitis externa, pt states symptoms did not fully clear up. ) and Sore Throat (ST X 2 wks. Pt states he was recently treated for thrush, denies any itching or white film in mouth but states pain in throat lingering since treatment. ).        Past Medical History  Allergies as of 12/10/2024    (No Known Allergies)       (Not in a hospital admission)       No past medical history on file.    Past Surgical History:   Procedure Laterality Date    MOUTH SURGERY  09/20/2016    Oral Surgery Tooth Extraction    TYMPANOSTOMY TUBE PLACEMENT  09/20/2016    Ear Pressure Equalization Tube, Insertion, Bilaterally        reports that he has quit smoking. His smoking use included cigarettes. He has been exposed to tobacco smoke. He has never used smokeless tobacco. He reports that he does not currently use alcohol. He reports that he does not use drugs.    Review of Systems  Review of Systems                               Objective    Vitals:    12/10/24 1942   BP: (!) 155/94   Pulse: 78   Resp: 16   Temp: 36.6 °C (97.8 °F)   SpO2: 98%     No LMP for male patient.    Physical Exam  Vitals reviewed.   HENT:      Head: Normocephalic and atraumatic.      Ears:      Comments: R. Canal erythema     Nose: Nose normal.      Mouth/Throat:      Mouth: Mucous membranes are moist.      Pharynx: Posterior oropharyngeal erythema present.   Eyes:      Extraocular Movements: Extraocular movements intact.      Conjunctiva/sclera: Conjunctivae normal.      Pupils: Pupils are equal, round, and reactive to light.   Cardiovascular:      Rate and Rhythm: Normal rate and regular rhythm.   Pulmonary:      Effort: Pulmonary effort is normal.      Breath sounds: Normal breath sounds.   Skin:     General: Skin is warm.   Neurological:      Mental Status: He is alert and oriented to  person, place, and time.   Psychiatric:         Mood and Affect: Mood normal.         Behavior: Behavior normal.             Point of Care Test & Imaging Results from this visit  Results for orders placed or performed in visit on 12/10/24   POCT rapid strep A manually resulted   Result Value Ref Range    POC Rapid Strep Negative Negative   POCT Infectious mononucleosis antibody manually resulted   Result Value Ref Range    POC Rapid Mono Negative Negative      No results found.    Diagnostic study results (if any) were reviewed by Prema George PA-C.    Assessment/Plan   Allergies, medications, history, and pertinent labs/EKGs/Imaging reviewed by Prema George PA-C.     Medical Decision Making  -         Patient is educated about their diagnoses.     -          Discussed medications benefits and adverse effects.     -          Answered all patient’s questions.     -          Patient will call 911 or go to the nearest ED if worsen symptoms .     -          Patient is agreeable to the plan of care and is deemed stable upon discharge.     -          Follow up with your primary care provider in two days.      Orders and Diagnoses  Diagnoses and all orders for this visit:  Right ear pain  -     ofloxacin (Floxin) 0.3 % otic solution; Administer 5 drops into the right ear once daily for 7 days.  Sore throat  -     POCT rapid strep A manually resulted  -     POCT Infectious mononucleosis antibody manually resulted  -     fluconazole (Diflucan) 150 mg tablet; 1 po for one dose then repeat after 3 days once.      Medical Admin Record      Patient disposition: Home    Electronically signed by Prema George PA-C  8:03 PM

## 2024-12-12 ENCOUNTER — APPOINTMENT (OUTPATIENT)
Dept: PULMONOLOGY | Facility: HOSPITAL | Age: 34
End: 2024-12-12
Payer: COMMERCIAL

## 2024-12-15 ENCOUNTER — PHARMACY VISIT (OUTPATIENT)
Dept: PHARMACY | Facility: CLINIC | Age: 34
End: 2024-12-15
Payer: COMMERCIAL

## 2024-12-15 PROCEDURE — RXMED WILLOW AMBULATORY MEDICATION CHARGE

## 2024-12-16 ENCOUNTER — APPOINTMENT (OUTPATIENT)
Dept: OTOLARYNGOLOGY | Facility: CLINIC | Age: 34
End: 2024-12-16
Payer: COMMERCIAL

## 2024-12-16 VITALS
WEIGHT: 206.6 LBS | BODY MASS INDEX: 31.31 KG/M2 | DIASTOLIC BLOOD PRESSURE: 88 MMHG | HEIGHT: 68 IN | TEMPERATURE: 97.6 F | SYSTOLIC BLOOD PRESSURE: 153 MMHG

## 2024-12-16 DIAGNOSIS — J30.9 CHRONIC ALLERGIC RHINITIS: ICD-10-CM

## 2024-12-16 DIAGNOSIS — Z86.19 HISTORY OF ORAL CANDIDIASIS: ICD-10-CM

## 2024-12-16 DIAGNOSIS — H92.03 OTALGIA OF BOTH EARS: Primary | ICD-10-CM

## 2024-12-16 PROCEDURE — 3079F DIAST BP 80-89 MM HG: CPT | Performed by: OTOLARYNGOLOGY

## 2024-12-16 PROCEDURE — 1036F TOBACCO NON-USER: CPT | Performed by: OTOLARYNGOLOGY

## 2024-12-16 PROCEDURE — 3077F SYST BP >= 140 MM HG: CPT | Performed by: OTOLARYNGOLOGY

## 2024-12-16 PROCEDURE — 3008F BODY MASS INDEX DOCD: CPT | Performed by: OTOLARYNGOLOGY

## 2024-12-16 PROCEDURE — 99203 OFFICE O/P NEW LOW 30 MIN: CPT | Performed by: OTOLARYNGOLOGY

## 2024-12-16 NOTE — PROGRESS NOTES
Subjective     History of Present Illness:   Jose Zavala is a 34 y.o. male who presents to GI clinic for follow-up of GERD and possible intestinal metaplasia.  He had an EGD in 2022 at [facility] and was told he may have intestine metaplasia of the stomach and was told to repeat endoscopy in 2 years.  He saw Dr Hilario who told him to come back in 3 years  He does have chronic GERD but takes Pepcid for it once a day and with that his symptoms are mostly controlled.  In addition to this he has asthma and allergies and he is being tested for allergies as well.        Review of Systems  Review of Systems   Constitutional: Negative.    HENT: Negative.     Eyes: Negative.    Respiratory: Negative.     Cardiovascular: Negative.    Gastrointestinal:         As in HPI    Endocrine: Negative.    Genitourinary: Negative.    Musculoskeletal: Negative.    Skin: Negative.    Neurological: Negative.    Psychiatric/Behavioral: Negative.         Past Medical History   has no past medical history on file.     Social History   reports that he has quit smoking. His smoking use included cigarettes. He has been exposed to tobacco smoke. He has never used smokeless tobacco. He reports that he does not currently use alcohol. He reports that he does not use drugs.     Family History  family history is not on file.     Allergies  No Known Allergies    Medications  Current Outpatient Medications   Medication Instructions    albuterol 90 mcg/actuation inhaler 2 puffs, Every 6 hours PRN    albuterol 1.25 mg, nebulization, Every 6 hours PRN    amLODIPine-benazepriL (LotreL) 5-10 mg capsule 1 capsule, oral, Daily    atorvastatin (LIPITOR) 10 mg, oral, Daily    budesonide (PULMICORT) 0.5 mg, nebulization, 2 times daily, Rinse mouth with water after use to reduce aftertaste and incidence of candidiasis. Do not swallow.    busPIRone (BUSPAR) 10 mg, 3 times daily    famotidine (PEPCID) 20 mg, Daily    fluconazole (Diflucan) 150 mg  tablet 1 po for one dose then repeat after 3 days once.    fluticasone (Flonase) 50 mcg/actuation nasal spray 1 spray, Each Nostril, Daily RT    fluticasone furoate-vilanteroL (Breo Ellipta) 200-25 mcg/dose inhaler INHALE 1 PUFF BY MOUTH ONCE DAILY    fluticasone-umeclidin-vilanter (Trelegy Ellipta) 200-62.5-25 mcg blister with device 1 puff, inhalation, Daily    Incruse Ellipta 62.5 mcg, inhalation, Daily    montelukast (SINGULAIR) 10 mg, oral, Nightly    ofloxacin (Floxin) 0.3 % otic solution 5 drops, Right Ear, Daily    sertraline (ZOLOFT) 100 mg, Daily       Objective   There were no vitals taken for this visit.  Physical Exam  Vitals reviewed.   Constitutional:       Appearance: Normal appearance.   HENT:      Head: Normocephalic.   Eyes:      Conjunctiva/sclera: Conjunctivae normal.      Pupils: Pupils are equal, round, and reactive to light.   Cardiovascular:      Rate and Rhythm: Normal rate and regular rhythm.   Pulmonary:      Effort: Pulmonary effort is normal.      Breath sounds: Normal breath sounds.   Abdominal:      General: Abdomen is flat. Bowel sounds are normal. There is no distension.      Palpations: Abdomen is soft.      Tenderness: There is no abdominal tenderness. There is no guarding or rebound.   Musculoskeletal:         General: Normal range of motion.   Skin:     General: Skin is warm and dry.   Neurological:      General: No focal deficit present.      Mental Status: He is alert and oriented to person, place, and time.                 Assessment/Plan   Jose Zavala is a 34 y.o. male who presents to GI clinic for evaluation for possible EGD.    1.  Intestinal metaplasia of the stomach.  Told him that at this point there is no clear guidelines about what to do for this.  He does not have any significant risk factors like family history of gastric cancer.  I told him that he we will repeat the EGD repeat the biopsies and do mapping biopsies for and test metaplasia and if he does not have  any then he should not worry about it and if there is any evidence then may be a repeat EGD every 3 to 5 years is reasonable.    2.  GERD.  Will check for any complications.  Will also consider biopsies for EOE given his strong type II inflammatory disease history.  .          James Randall MD

## 2024-12-17 ENCOUNTER — APPOINTMENT (OUTPATIENT)
Dept: GASTROENTEROLOGY | Facility: CLINIC | Age: 34
End: 2024-12-17
Payer: COMMERCIAL

## 2024-12-17 VITALS
HEART RATE: 72 BPM | BODY MASS INDEX: 31.52 KG/M2 | DIASTOLIC BLOOD PRESSURE: 92 MMHG | WEIGHT: 208 LBS | HEIGHT: 68 IN | SYSTOLIC BLOOD PRESSURE: 137 MMHG

## 2024-12-17 DIAGNOSIS — K21.9 GASTROESOPHAGEAL REFLUX DISEASE, UNSPECIFIED WHETHER ESOPHAGITIS PRESENT: Primary | ICD-10-CM

## 2024-12-17 DIAGNOSIS — K31.A0 INTESTINAL METAPLASIA OF STOMACH: ICD-10-CM

## 2024-12-17 PROCEDURE — 3080F DIAST BP >= 90 MM HG: CPT | Performed by: INTERNAL MEDICINE

## 2024-12-17 PROCEDURE — 3075F SYST BP GE 130 - 139MM HG: CPT | Performed by: INTERNAL MEDICINE

## 2024-12-17 PROCEDURE — 99214 OFFICE O/P EST MOD 30 MIN: CPT | Performed by: INTERNAL MEDICINE

## 2024-12-17 PROCEDURE — 1036F TOBACCO NON-USER: CPT | Performed by: INTERNAL MEDICINE

## 2024-12-17 PROCEDURE — 3008F BODY MASS INDEX DOCD: CPT | Performed by: INTERNAL MEDICINE

## 2024-12-17 RX ORDER — HYDROXYZINE HYDROCHLORIDE 25 MG/1
25 TABLET, FILM COATED ORAL DAILY PRN
COMMUNITY
Start: 2024-11-19

## 2024-12-17 ASSESSMENT — ENCOUNTER SYMPTOMS
CONSTITUTIONAL NEGATIVE: 1
PSYCHIATRIC NEGATIVE: 1
ROS GI COMMENTS: AS IN HPI
CARDIOVASCULAR NEGATIVE: 1
RESPIRATORY NEGATIVE: 1
ENDOCRINE NEGATIVE: 1
EYES NEGATIVE: 1
MUSCULOSKELETAL NEGATIVE: 1
NEUROLOGICAL NEGATIVE: 1

## 2024-12-31 ENCOUNTER — APPOINTMENT (OUTPATIENT)
Dept: OTOLARYNGOLOGY | Facility: CLINIC | Age: 34
End: 2024-12-31
Payer: COMMERCIAL

## 2024-12-31 VITALS — BODY MASS INDEX: 31.07 KG/M2 | WEIGHT: 205 LBS | HEIGHT: 68 IN

## 2024-12-31 DIAGNOSIS — B37.89 LARYNGEAL CANDIDIASIS: Primary | ICD-10-CM

## 2024-12-31 DIAGNOSIS — J39.2 THROAT IRRITATION: ICD-10-CM

## 2024-12-31 PROCEDURE — 31575 DIAGNOSTIC LARYNGOSCOPY: CPT

## 2024-12-31 PROCEDURE — 3008F BODY MASS INDEX DOCD: CPT

## 2024-12-31 PROCEDURE — 1036F TOBACCO NON-USER: CPT

## 2024-12-31 PROCEDURE — 99203 OFFICE O/P NEW LOW 30 MIN: CPT

## 2024-12-31 RX ORDER — CLOTRIMAZOLE 10 MG/1
10 LOZENGE ORAL
Qty: 70 TROCHE | Refills: 0 | Status: SHIPPED | OUTPATIENT
Start: 2024-12-31 | End: 2025-01-14

## 2024-12-31 ASSESSMENT — PATIENT HEALTH QUESTIONNAIRE - PHQ9
1. LITTLE INTEREST OR PLEASURE IN DOING THINGS: NOT AT ALL
SUM OF ALL RESPONSES TO PHQ9 QUESTIONS 1 AND 2: 0
2. FEELING DOWN, DEPRESSED OR HOPELESS: NOT AT ALL

## 2024-12-31 NOTE — PROGRESS NOTES
Reason For Consult  Assessment for thrush       HISTORY OF PRESENT ILLNESS:  This is a request for consultation from  for Jose Zavala, who is a 34 y.o. male presenting for an initial visit for recurrent thrush over the past 3-4 months. The patient reports that he has been previously treated with fluconazole x2 courses, Nystatin for one course , and Mycelex troches one course since September. Patient reports thrush cleared, but last week needed increased use of Pulmicort and noticed white spots on the back wall of throat and uvula. Patient also currently using Trelegy.  Patient denies any voice changes. Patient denies any issues with swallowing solids, liquids, or pills. Patient denies any smoking history.     Past Medical History  He has no past medical history on file.  Surgical History  He has a past surgical history that includes Tympanostomy tube placement (09/20/2016) and Mouth surgery (09/20/2016).     Social History  He reports that he has quit smoking. His smoking use included cigarettes. He has been exposed to tobacco smoke. He has never used smokeless tobacco. He reports current alcohol use. He reports that he does not use drugs.    Allergies  Patient has no known allergies.    Review of Systems  All 10 systems were reviewed and negative except for above.      Physical Exam  CONSTITUTIONAL: Well developed, well nourished.    VOICE: normal  RESPIRATION: Breathing comfortably, no stridor.    NEURO: Alert and oriented x3, cranial nerves II-XII intact and symmetric bilaterally.    EARS: Normal external ears, external auditory canals, normal hearing to conversational voice.    NOSE: External nose midline, anterior rhinoscopy is normal with limited visualization to the anterior aspect of the interior turbinates. No lesions noted.     ORAL CAVITY/OROPHARYNX/LIPS: Normal mucous membranes, normal floor of mouth/tongue/OP, no masses or lesions are noted.    SKIN: Neck skin is intact, no scar or injury.    PSYCH:  Alert and oriented with appropriate mood and affect.        Last Recorded Vitals  There were no vitals taken for this visit.    Procedure  PROCEDURE NOTE:  Recommended flexible laryngoscopy/stroboscopy.  Risks, benefits,  and alternatives were explained.  They wish to proceed and provide verbal consent.     PROCEDURE:  Flexible Laryngoscopy, CPT 82367      POSTPROCEDURE DIAGNOSIS: laryngeal candidiasis    INDICATIONS: Inability to tolerate mirror exam or abnormal findings on mirror, Flexible Laryngoscopy/Stroboscopy performed to assess one of the followin. Diagnosis of symptomatic disorder involving the voice, swallow, upper aerodigestive tract, including JOELLE disorders, or  2. Preoperative evaluation of vocal cord function for individuals undergoing surgery where the RLN or vagus nerves are at risk of injury, or  3. Further evaluation of abnormalities of the upper aerodigestive tract discovered by another modality, such as CT, MRI, bronchoscopy or EGD    Description of Procedure:    After adequate afrin and lidocaine spray, I advanced the endoscope.  Visualization of the nasopharynx, vallecula, posterior pharyngeal walls, pyriform, epiglottis and post cricoid areas was unremarkable.  The following laryngeal findings were noted:    vocal cord movement was normal bilaterally  closure was complete  Compression was increased AP= FVC   edema was none  interarytenoid edema none  lesions were none  the subglottis was widely patent  Pharyngeal wall squeeze was normal    Procedure well tolerated.     ASSESSMENT AND PLAN:   This is an initial visit for assessment for thrush with clinical findings notable for good vc movement with closure. No thrush noted on exam. Patient does have thick discolored mucus on base of tongue and laryngeal wall that is mildly stringy. Discussed seltzer water gargles and tongue scraper daily for clearing of mucus in throat. Discussed Mycelex troches for laryngeal candidiasis. Will have patient  follow up in 1 month and repeat laryngoscopy after antifungal treatment. Patient agreeable to plan. All questions answered.

## 2025-01-09 ENCOUNTER — LAB (OUTPATIENT)
Dept: LAB | Facility: LAB | Age: 35
End: 2025-01-09
Payer: COMMERCIAL

## 2025-01-09 ENCOUNTER — APPOINTMENT (OUTPATIENT)
Dept: ALLERGY | Facility: CLINIC | Age: 35
End: 2025-01-09
Payer: COMMERCIAL

## 2025-01-09 VITALS
WEIGHT: 204 LBS | TEMPERATURE: 97.8 F | HEIGHT: 68 IN | DIASTOLIC BLOOD PRESSURE: 80 MMHG | RESPIRATION RATE: 17 BRPM | SYSTOLIC BLOOD PRESSURE: 128 MMHG | HEART RATE: 71 BPM | OXYGEN SATURATION: 97 % | BODY MASS INDEX: 30.92 KG/M2

## 2025-01-09 DIAGNOSIS — B99.9 RECURRENT INFECTIONS: ICD-10-CM

## 2025-01-09 DIAGNOSIS — B99.9 RECURRENT INFECTIONS: Primary | ICD-10-CM

## 2025-01-09 DIAGNOSIS — J30.2 SEASONAL ALLERGIC RHINITIS DUE TO FUNGAL SPORES: ICD-10-CM

## 2025-01-09 DIAGNOSIS — J30.1 SEASONAL ALLERGIC RHINITIS DUE TO POLLEN: ICD-10-CM

## 2025-01-09 DIAGNOSIS — J45.50 SEVERE PERSISTENT ASTHMA WITHOUT COMPLICATION (MULTI): ICD-10-CM

## 2025-01-09 PROCEDURE — 85025 COMPLETE CBC W/AUTO DIFF WBC: CPT

## 2025-01-09 PROCEDURE — 95004 PERQ TESTS W/ALRGNC XTRCS: CPT | Performed by: ALLERGY & IMMUNOLOGY

## 2025-01-09 PROCEDURE — 99204 OFFICE O/P NEW MOD 45 MIN: CPT | Performed by: ALLERGY & IMMUNOLOGY

## 2025-01-09 PROCEDURE — 82784 ASSAY IGA/IGD/IGG/IGM EACH: CPT

## 2025-01-09 RX ORDER — BISMUTH SUBSALICYLATE 262 MG
1 TABLET,CHEWABLE ORAL DAILY
COMMUNITY

## 2025-01-09 NOTE — PATIENT INSTRUCTIONS
Allergy skin tests show mainly tree pollen and mold (Alternaria)      If you have pets, avoid having them in the bedroom if possible.  Regular grooming and wiped the pet with a pet allergy wipe will help with dander and allergens on the pet's fur.  Vacuum regularly.    Keep humidity in the home 30-50% to decrease mold growth.  Clean bathrooms with a bleach solution and fix any leaking faucets.  You may also wipe down windowsills with  bleach solution.    For pollen allergy, keep windows closed and use central air.  You can consider wearing a hat and sunglasses as well to reduce pollen exposure.  After being outside, wash hands and face or shower to reduce the pollen on your body.  Wash clothing after wearing outside during the pollen seasons.      Follow up in one month

## 2025-01-09 NOTE — PROGRESS NOTES
Patient ID: Jose Zavala is a 34 y.o. male.     Chief Complaint: NPV self referred  He is here with his wife Sudhir  History Of Present Illness  Jose Zavala is a 34 y.o. male with PMx asthma and allergic rhinitis presenting for consultation.     Had been on allergy shots as a child. Would like to be retested.    Food Allergy  No    Eczema/ Atopic Dermatitis  No  Melanoma removed 2 yrs ago.    Asthma  Asthma    Age at diagnosis: 5-5 yo    Current medication: Singulair, Trelegy 200, Albuterol. In the past, he used additional budesonide with exacerbation, but had oral thrush.  He has not started Airsupra for this reason.  Hospitalizations/ER visits in the last year: 2 times urgent care and ED once.  Oral steroid/systemic steroids in the last year: once time at least  Triggers: outside  Smoker or Smoke exposure: history of smoking. Quit 2015. 1/2 ppd for 5-6 yr.  Family history:  dad and brother has AR, asthma, mother with allergy    He has a history of GERD controlled with medication/diet  He reports occasional dysphagia. He attributes this to eating too fast.  He has had the [facility] twice. He is planning EGD.    Rhinoconjunctivitis  Zyrtec and Flonase  Shots in the past.  Last Zyrtec-Monday    Drug Allergy   No    Insect Allergy   No    Infections  Recurrent otitis.  In the last couple years fall and spring has been diagnosed with ear infection  As a child recurrent sinus and ear infections  Ear tubes as a child.      Review of Systems    Pertinent positives and negatives have been assessed in the HPI. All other systems have been reviewed and are negative except as noted in the HPI.    Allergies  Patient has no known allergies.    Past Medical History  He has no past medical history on file.    Family History  No family history on file.      Surgical History  He has a past surgical history that includes Tympanostomy tube placement (09/20/2016) and Mouth surgery (09/20/2016).    Social/Environmental History  He  reports that he has quit smoking. His smoking use included cigarettes. He has been exposed to tobacco smoke. He has never used smokeless tobacco. He reports current alcohol use. He reports that he does not use drugs.    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 75 mL 11    albuterol 90 mcg/actuation inhaler Inhale 2 puffs every 6 hours if needed for wheezing.      amLODIPine-benazepriL (LotreL) 5-10 mg capsule Take 1 capsule by mouth once daily. 90 capsule 1    atorvastatin (Lipitor) 10 mg tablet Take 1 tablet (10 mg) by mouth once daily. 30 tablet 5    busPIRone (Buspar) 10 mg tablet Take 1 tablet (10 mg) by mouth 3 times a day.      clotrimazole (Mycelex) 10 mg adenike Take 1 tablet (10 mg) by mouth 5 times a day for 14 days. 70 Adenike 0    fluticasone (Flonase) 50 mcg/actuation nasal spray Administer 1 spray into each nostril once daily. 16 g 5    fluticasone-umeclidin-vilanter (Trelegy Ellipta) 200-62.5-25 mcg blister with device Inhale 1 puff once daily. 1 each 1    hydrOXYzine HCL (Atarax) 25 mg tablet Take 1 tablet (25 mg) by mouth once daily as needed for anxiety.      montelukast (Singulair) 10 mg tablet Take 1 tablet (10 mg) by mouth once daily at bedtime. 90 tablet 1    multivitamin tablet Take 1 tablet by mouth once daily.      sertraline (Zoloft) 100 mg tablet Take 1 tablet (100 mg) by mouth once daily.      famotidine (Pepcid) 20 mg tablet Take 1 tablet (20 mg) by mouth once daily. (Patient not taking: Reported on 1/9/2025)       No current facility-administered medications on file prior to visit.       Physical Exam  Visit  "Vitals  /80   Pulse 71   Temp 36.6 °C (97.8 °F) (Temporal)   Resp 17   Ht 1.727 m (5' 8\")   Wt 92.5 kg (204 lb)   SpO2 97%   BMI 31.02 kg/m²   Smoking Status Former   BSA 2.11 m²       Wt Readings from Last 1 Encounters:   01/09/25 92.5 kg (204 lb)       Physical Exam    General: Well appearing, no acute distress  Head: Normocephalic, atraumatic, neck supple without lymphadenopathy  Eyes: EOMI, non-injected  Ears: Mildly retrated bilaterally. Visible landmarks. Mild hyperemia on the left tm.  Nose: No nasal crease, nares patent, slightly boggy turbinates, minimal discharge  Throat: Normal dentition, no erythema, no thrush  Heart: Regular rate and rhythm  Lungs: Clear to auscultation bilaterally, effort normal  Abdomen: Soft, non-tender, normal bowel sounds  Extremities: Moves all extremities symmetrically, no edema  Skin: No rashes/lesions  Psych: normal mood and affect    LAB RESULTS:  CBC:  Recent Labs     09/13/24 1653 07/02/24  1110 05/19/23  1124 03/19/23  0638   WBC 10.2 5.4 4.9 6.0   HGB 17.5 16.7 16.4 16.3   HCT 54.3* 51.3 50.6 47.9    208 192 193   MCV 97 97 97 93   EOSABS 0.02  --  0.05 0.06       CMP:  Recent Labs     09/13/24 1653 07/02/24  1110 01/09/24  1248    140 139   K 4.2 4.1 4.2    103 102   CO2 24 29 30   ANIONGAP 16 12 11   BUN 14 11 13   CREATININE 0.93 0.82 0.80   EGFR >90 >90 >90     Recent Labs     09/13/24  1653 07/02/24  1110 01/09/24  1248 11/06/23  1329   ALBUMIN 5.1* 4.5 4.6 4.8   ALKPHOS  --  65 64 66   ALT  --  72* 78* 87*   AST  --  31 31 35   BILITOT  --  0.6 0.4 0.6       ALLERGY:   Lab Results   Component Value Date    ICIGE 148 09/19/2024    WHITEASH 0.68 (Low) 09/19/2024    SILVERBIRCH 0.15 (Equiv IgE) 09/19/2024    BOXELDER <0.10 09/19/2024    MOUNTJUNIPER 0.17 (Equiv IgE) 09/19/2024    COTTONWOOD 0.16 (Equiv IgE) 09/19/2024    ELM 0.29 (Equiv IgE) 09/19/2024    MULBERRY <0.10 09/19/2024    PECANHICKORY <0.10 09/19/2024    MAPLESYCAMOR 0.18 (Equiv " IgE) 09/19/2024    OAK 0.15 (Equiv IgE) 09/19/2024    BERMUDAGR 0.12 (Equiv IgE) 09/19/2024    JOHNSONGR 0.13 (Equiv IgE) 09/19/2024    BLUEGRASS 0.22 (Equiv IgE) 09/19/2024    TIMOTHYGRASS 0.15 (Equiv IgE) 09/19/2024     Lab Results   Component Value Date    LAMBQUART 0.16 (Equiv IgE) 09/19/2024    PIGWEED <0.10 09/19/2024    COMRAGWEED 0.27 (Equiv IgE) 09/19/2024    RUSSIANT 0.13 (Equiv IgE) 09/19/2024    SHEEPSOR 0.10 (Equiv IgE) 09/19/2024    PLANTAIN 0.15 (Equiv IgE) 09/19/2024    CATEPI <0.10 09/19/2024    DOGEPI 0.49 (Low) 09/19/2024    ALTERNA 4.78 (High) 09/19/2024    CLADHERB 1.09 (Mod) 09/19/2024    ICA04 1.64 (Mod) 09/19/2024    PENICILLIUM 0.48 (Low) 09/19/2024    DERMFAR 0.13 (Equiv IgE) 09/19/2024    DERMPTE 0.18 (Equiv IgE) 09/19/2024    COCKR <0.10 09/19/2024       Recent Labs     09/19/24  1109   ICIGE 148     Recent Labs     09/13/24  1653 05/19/23  1124 03/19/23  0638   EOSABS 0.02 0.05 0.06       HEME/ENDO:  Recent Labs     07/02/24  1110 05/19/23  1124   TSH 1.02 1.54   HGBA1C 5.1  --      Allergy skin test-reviewed and scanned  Assessment/Plan   Jose is a 35 yo with history of allergy and asthma as well as dysphagia, gerd and history of recurrent infections.  Skin tests show mainly tree pollen and Alternaria mold allergy.  -review of avoidance and medication  -obtain labs-follow up in a month  -Due to history of recurrent need for oral steroids, a biologic for asthma would be a consideration for Jose.  -Patient could also consider allergy immunotherapy.  Follow up is planned in a month.    Krystle Dumas DO

## 2025-01-10 ENCOUNTER — APPOINTMENT (OUTPATIENT)
Dept: PULMONOLOGY | Facility: CLINIC | Age: 35
End: 2025-01-10
Payer: COMMERCIAL

## 2025-01-10 LAB
BASOPHILS # BLD AUTO: 0.05 X10*3/UL (ref 0–0.1)
BASOPHILS NFR BLD AUTO: 0.8 %
EOSINOPHIL # BLD AUTO: 0.24 X10*3/UL (ref 0–0.7)
EOSINOPHIL NFR BLD AUTO: 3.7 %
ERYTHROCYTE [DISTWIDTH] IN BLOOD BY AUTOMATED COUNT: 12 % (ref 11.5–14.5)
HCT VFR BLD AUTO: 52 % (ref 41–52)
HGB BLD-MCNC: 17.9 G/DL (ref 13.5–17.5)
IGA SERPL-MCNC: 396 MG/DL (ref 70–400)
IGG SERPL-MCNC: 1270 MG/DL (ref 700–1600)
IGM SERPL-MCNC: 247 MG/DL (ref 40–230)
IMM GRANULOCYTES # BLD AUTO: 0.01 X10*3/UL (ref 0–0.7)
IMM GRANULOCYTES NFR BLD AUTO: 0.2 % (ref 0–0.9)
LYMPHOCYTES # BLD AUTO: 1.89 X10*3/UL (ref 1.2–4.8)
LYMPHOCYTES NFR BLD AUTO: 29.1 %
MCH RBC QN AUTO: 31.8 PG (ref 26–34)
MCHC RBC AUTO-ENTMCNC: 34.4 G/DL (ref 32–36)
MCV RBC AUTO: 92 FL (ref 80–100)
MONOCYTES # BLD AUTO: 0.43 X10*3/UL (ref 0.1–1)
MONOCYTES NFR BLD AUTO: 6.6 %
NEUTROPHILS # BLD AUTO: 3.87 X10*3/UL (ref 1.2–7.7)
NEUTROPHILS NFR BLD AUTO: 59.6 %
NRBC BLD-RTO: 0 /100 WBCS (ref 0–0)
PLATELET # BLD AUTO: 250 X10*3/UL (ref 150–450)
RBC # BLD AUTO: 5.63 X10*6/UL (ref 4.5–5.9)
WBC # BLD AUTO: 6.5 X10*3/UL (ref 4.4–11.3)

## 2025-01-11 LAB — CH50 SERPL-ACNC: 79.3 U/ML (ref 38.7–89.9)

## 2025-01-12 LAB
C TETANI TOXOID IGG SERPL IA-ACNC: 3.4 IU/ML
S PN DA SERO 19F IGG SER-MCNC: 9.98 UG/ML
S PNEUM DA 1 IGG SER-MCNC: 0.68 UG/ML
S PNEUM DA 10A IGG SER-MCNC: 0.32 UG/ML
S PNEUM DA 11A IGG SER-MCNC: 0.24 UG/ML
S PNEUM DA 12F IGG SER-MCNC: 0.83 UG/ML
S PNEUM DA 14 IGG SER-MCNC: 0.14 UG/ML
S PNEUM DA 15B IGG SER-MCNC: 0.61 UG/ML
S PNEUM DA 17F IGG SER-MCNC: 0.29 UG/ML
S PNEUM DA 18C IGG SER-MCNC: 0.67 UG/ML
S PNEUM DA 19A IGG SER-MCNC: 2.85 UG/ML
S PNEUM DA 2 IGG SER-MCNC: 1.73 UG/ML
S PNEUM DA 20A IGG SER-MCNC: 0.59 UG/ML
S PNEUM DA 22F IGG SER-MCNC: 0.57 UG/ML
S PNEUM DA 23F IGG SER-MCNC: 0.62 UG/ML
S PNEUM DA 3 IGG SER-MCNC: 1.89 UG/ML
S PNEUM DA 33F IGG SER-MCNC: 0.43 UG/ML
S PNEUM DA 4 IGG SER-MCNC: 0.24 UG/ML
S PNEUM DA 5 IGG SER-MCNC: 0.6 UG/ML
S PNEUM DA 6B IGG SER-MCNC: 0.68 UG/ML
S PNEUM DA 7F IGG SER-MCNC: 0.2 UG/ML
S PNEUM DA 8 IGG SER-MCNC: 1.06 UG/ML
S PNEUM DA 9N IGG SER-MCNC: 0.62 UG/ML
S PNEUM DA 9V IGG SER-MCNC: 0.16 UG/ML
S PNEUM SEROTYPE IGG SER-IMP: NORMAL

## 2025-01-14 DIAGNOSIS — J45.909 ASTHMA, UNSPECIFIED ASTHMA SEVERITY, UNSPECIFIED WHETHER COMPLICATED, UNSPECIFIED WHETHER PERSISTENT (HHS-HCC): ICD-10-CM

## 2025-01-16 RX ORDER — FLUTICASONE FUROATE, UMECLIDINIUM BROMIDE AND VILANTEROL TRIFENATATE 200; 62.5; 25 UG/1; UG/1; UG/1
1 POWDER RESPIRATORY (INHALATION) DAILY
Qty: 1 EACH | Refills: 1 | Status: SHIPPED | OUTPATIENT
Start: 2025-01-16

## 2025-01-22 ENCOUNTER — OFFICE VISIT (OUTPATIENT)
Dept: OTOLARYNGOLOGY | Facility: CLINIC | Age: 35
End: 2025-01-22
Payer: COMMERCIAL

## 2025-01-22 DIAGNOSIS — H93.8X1 EAR PRESSURE, RIGHT: ICD-10-CM

## 2025-01-22 DIAGNOSIS — H60.8X1 CHRONIC ECZEMATOUS OTITIS EXTERNA OF RIGHT EAR: Primary | ICD-10-CM

## 2025-01-22 DIAGNOSIS — M26.629 TEMPOROMANDIBULAR JOINT-PAIN-DYSFUNCTION SYNDROME: ICD-10-CM

## 2025-01-22 PROCEDURE — 99213 OFFICE O/P EST LOW 20 MIN: CPT | Performed by: STUDENT IN AN ORGANIZED HEALTH CARE EDUCATION/TRAINING PROGRAM

## 2025-01-22 PROCEDURE — 1036F TOBACCO NON-USER: CPT | Performed by: STUDENT IN AN ORGANIZED HEALTH CARE EDUCATION/TRAINING PROGRAM

## 2025-01-22 RX ORDER — CLOTRIMAZOLE 1 G/ML
SOLUTION TOPICAL
Qty: 6 ML | Refills: 0 | Status: SHIPPED | OUTPATIENT
Start: 2025-01-22

## 2025-01-22 RX ORDER — CLOTRIMAZOLE AND BETAMETHASONE DIPROPIONATE 10; .64 MG/G; MG/G
CREAM TOPICAL
Qty: 45 G | Refills: 0 | Status: SHIPPED | OUTPATIENT
Start: 2025-01-22

## 2025-01-22 ASSESSMENT — PATIENT HEALTH QUESTIONNAIRE - PHQ9
SUM OF ALL RESPONSES TO PHQ9 QUESTIONS 1 AND 2: 0
2. FEELING DOWN, DEPRESSED OR HOPELESS: NOT AT ALL
1. LITTLE INTEREST OR PLEASURE IN DOING THINGS: NOT AT ALL

## 2025-01-22 ASSESSMENT — COLUMBIA-SUICIDE SEVERITY RATING SCALE - C-SSRS: 1. IN THE PAST MONTH, HAVE YOU WISHED YOU WERE DEAD OR WISHED YOU COULD GO TO SLEEP AND NOT WAKE UP?: NO

## 2025-01-22 ASSESSMENT — PAIN SCALES - GENERAL: PAINLEVEL_OUTOF10: 0-NO PAIN

## 2025-01-22 NOTE — PROGRESS NOTES
ZACK Zavala is a 35 y.o. male presenting for initial evaluation of right aural fullness.  The patient reports intermittent episodes of right aural fullness for years.  Endorses occasional right high-frequency tinnitus and history of a right ear infection last year.  Denies recent episodes of otitis. Endorses nighttime bruxism.   Endorses subjective bilateral progressive hearing loss, denies sudden hearing changes.  Denies current ear pain, vertigo, discharge from ear or autophony.   Denies history of prior ear surgery.    No past medical history on file.    Past Surgical History:   Procedure Laterality Date    MOUTH SURGERY  09/20/2016    Oral Surgery Tooth Extraction    TYMPANOSTOMY TUBE PLACEMENT  09/20/2016    Ear Pressure Equalization Tube, Insertion, Bilaterally         Current Outpatient Medications on File Prior to Visit   Medication Sig Dispense Refill    albuterol 1.25 mg/3 mL nebulizer solution Take 3 mL (1.25 mg) by nebulization every 6 hours if needed for wheezing. 75 mL 11    albuterol 90 mcg/actuation inhaler Inhale 2 puffs every 6 hours if needed for wheezing.      amLODIPine-benazepriL (LotreL) 5-10 mg capsule Take 1 capsule by mouth once daily. 90 capsule 1    atorvastatin (Lipitor) 10 mg tablet Take 1 tablet (10 mg) by mouth once daily. 30 tablet 5    busPIRone (Buspar) 10 mg tablet Take 1 tablet (10 mg) by mouth 3 times a day.      famotidine (Pepcid) 20 mg tablet Take 1 tablet (20 mg) by mouth once daily. (Patient not taking: Reported on 1/9/2025)      fluticasone (Flonase) 50 mcg/actuation nasal spray Administer 1 spray into each nostril once daily. 16 g 5    fluticasone-umeclidin-vilanter (Trelegy Ellipta) 200-62.5-25 mcg blister with device Inhale 1 puff once daily. 1 each 1    hydrOXYzine HCL (Atarax) 25 mg tablet Take 1 tablet (25 mg) by mouth once daily as needed for anxiety.      montelukast (Singulair) 10 mg tablet Take 1 tablet (10 mg) by mouth once daily at bedtime. 90 tablet 1     multivitamin tablet Take 1 tablet by mouth once daily.      sertraline (Zoloft) 100 mg tablet Take 1 tablet (100 mg) by mouth once daily.       No current facility-administered medications on file prior to visit.        No Known Allergies     Review of Systems  A detailed 12 point ROS was performed and is negative except as noted in the intake form, HPI and/or Past Medical History        Physical Exam   CONSTITUTIONAL: Well developed, well nourished.  VOICE: Normal voice quality  RESPIRATION: Breathing comfortably, no stridor.  CV: No clubbing/cyanosis/edema in hands.  EYES: EOM Intact, sclera normal.  NEURO: Alert and oriented times 3, Cranial nerves V,VII intact and symmetric bilaterally.  + HEAD AND FACE: Symmetric facial features, no masses or lesions, sinuses nontender to palpation.  Bilateral TMJ clicking  SALIVARY GLANDS: Parotid and submandibular glands normal bilaterally.  + EARS: Normal external ears  Right EAC dry/scaly, patent, tympanic membrane intact  Left EAC patent, tympanic membrane intact  NOSE: External nose midline, anterior rhinoscopy is normal with limited visualization to the anterior aspect of the interior turbinates. No lesions noted.  ORAL CAVITY/OROPHARYNX/LIPS: Normal mucous membranes, normal floor of mouth/tongue/OP, no masses or lesions are noted.  PHARYNGEAL WALLS AND NASOPHARYNX: No masses noted. Mucosa appears clean and moist  NECK/LYMPH: No LAD, no thyroid masses. Trachea palpably midline  SKIN: Neck skin is without injury  PSYCH: Alert and oriented with appropriate mood and affect       Assessment  Chronic eczematous otitis externa, right  Temporomandibular joint dysfunction  Right aural fullness    Plan  35-year-old male presenting for evaluation of right aural fullness in the setting of bruxism.  Aural fullness likely driven by temporomandibular joint dysfunction.  The condition was reviewed and explained, soft diet/Motrin prn use discussed.  He will follow-up with his dentist  for mouthguard elaboration.  Exam notable for right eczematous otitis, clotrimazole/betamethasone cream in addition to fluocinolone course prescribed.  We will proceed with audiogram and follow-up

## 2025-01-23 PROCEDURE — 88305 TISSUE EXAM BY PATHOLOGIST: CPT | Performed by: DERMATOLOGY

## 2025-01-26 PROCEDURE — RXMED WILLOW AMBULATORY MEDICATION CHARGE

## 2025-01-27 ENCOUNTER — LAB REQUISITION (OUTPATIENT)
Dept: DERMATOPATHOLOGY | Facility: CLINIC | Age: 35
End: 2025-01-27
Payer: COMMERCIAL

## 2025-01-27 ENCOUNTER — APPOINTMENT (OUTPATIENT)
Dept: ALLERGY | Facility: CLINIC | Age: 35
End: 2025-01-27
Payer: COMMERCIAL

## 2025-01-27 DIAGNOSIS — D48.5 NEOPLASM OF UNCERTAIN BEHAVIOR OF SKIN: ICD-10-CM

## 2025-01-27 DIAGNOSIS — Z78.9 OTHER SPECIFIED HEALTH STATUS: ICD-10-CM

## 2025-01-27 PROCEDURE — RXMED WILLOW AMBULATORY MEDICATION CHARGE

## 2025-01-28 ENCOUNTER — APPOINTMENT (OUTPATIENT)
Dept: OTOLARYNGOLOGY | Facility: CLINIC | Age: 35
End: 2025-01-28
Payer: COMMERCIAL

## 2025-01-28 VITALS — BODY MASS INDEX: 31.07 KG/M2 | HEIGHT: 68 IN | WEIGHT: 205 LBS

## 2025-01-28 DIAGNOSIS — Z09 FOLLOW-UP EXAM: Primary | ICD-10-CM

## 2025-01-28 DIAGNOSIS — B37.89 LARYNGEAL CANDIDIASIS: ICD-10-CM

## 2025-01-28 DIAGNOSIS — Z86.19 HISTORY OF THRUSH: ICD-10-CM

## 2025-01-28 LAB
LABORATORY COMMENT REPORT: NORMAL
PATH REPORT.FINAL DX SPEC: NORMAL
PATH REPORT.GROSS SPEC: NORMAL
PATH REPORT.MICROSCOPIC SPEC OTHER STN: NORMAL
PATH REPORT.RELEVANT HX SPEC: NORMAL
PATH REPORT.TOTAL CANCER: NORMAL

## 2025-01-28 PROCEDURE — 31575 DIAGNOSTIC LARYNGOSCOPY: CPT

## 2025-01-28 PROCEDURE — 99213 OFFICE O/P EST LOW 20 MIN: CPT

## 2025-01-28 PROCEDURE — 3008F BODY MASS INDEX DOCD: CPT

## 2025-01-28 NOTE — PROGRESS NOTES
Chief Complaint  Chief Complaint   Patient presents with    Follow-up     From thrush.        Pertinent History:  Seen on 24:  Jose Zavala, who is a 34 y.o. male presenting for an initial visit for recurrent thrush over the past 3-4 months. The patient reports that he has been previously treated with fluconazole x2 courses, Nystatin for one course , and Mycelex troches one course since September. Patient reports thrush cleared, but last week needed increased use of Pulmicort and noticed white spots on the back wall of throat and uvula. Patient also currently using Trelegy.  Patient denies any voice changes. Patient denies any issues with swallowing solids, liquids, or pills. Patient denies any smoking history.   Interval History  Since last visit patient completed x2 weeks of Mycelex troches. Patient reports proper cleaning of tongue, tonsils, and oral mucosa. Patient denies any throat pain or discomfort. Patient reported small area on tonsillar arch that cleared, reports was likely mucus. Patient reports breathing has been stable, has not needed extra use of inhalers.     Exam:  VOICE: Normal  RESPIRATION: Breathing comfortably, no stridor.    ORAL CAVITY/OROPHARYNX/LIPS: Normal mucous membranes, normal floor of mouth/tongue/OP, no masses or lesions are noted.    SKIN: Neck skin is without scar or injury.    PSYCH: Alert and oriented with appropriate mood and affect.       PROCEDURE NOTE:  Recommended flexible laryngoscopy.  Risks, benefits,  and alternatives were explained.   wished to proceed and provides verbal consent.     PROCEDURE:  Flexible Laryngoscopy, CPT 34263        INDICATIONS: Inability to tolerate mirror exam or abnormal findings on mirror, Flexible Laryngoscopy/Stroboscopy performed to assess one of the followin. Diagnosis of symptomatic disorder involving the voice, swallow, upper aerodigestive tract, including JOELLE disorders, or  2. Preoperative evaluation of vocal cord function for  individuals undergoing surgery where the RLN or vagus nerves are at risk of injury, or  3. Further evaluation of abnormalities of the upper aerodigestive tract discovered by another modality, such as CT, MRI, bronchoscopy or EGD    Description of Procedure:    After adequate afrin and lidocaine spray, I advanced the endoscope.  Visualization of the nasopharynx, vallecula, posterior pharyngeal walls, pyriform, epiglottis and post cricoid areas was unremarkable.  The following laryngeal findings were noted:    vocal cord movement was normal bilaterally  closure was complete  Compression was not increased  edema was  none  interarytenoid edema none  lesions were none  the subglottis was widely patent  Pharyngeal wall squeeze was normal    Procedure well tolerated.   Assessment and Plan:  This is a follow up visit for laryngeal candidiasis. Patient completed two weeks of Mycelex troches, and has not had any return of white film/patches. Patient can follow up as needed.     We discussed:  Laryngeal candidiasis resolved  2.   Can follow up as needed.   3.    The patient's questions were answered.

## 2025-01-30 ENCOUNTER — PHARMACY VISIT (OUTPATIENT)
Dept: PHARMACY | Facility: CLINIC | Age: 35
End: 2025-01-30
Payer: COMMERCIAL

## 2025-02-04 ENCOUNTER — ANESTHESIA EVENT (OUTPATIENT)
Dept: GASTROENTEROLOGY | Facility: EXTERNAL LOCATION | Age: 35
End: 2025-02-04

## 2025-02-05 NOTE — PROGRESS NOTES
Department of Medicine I Division of Pulmonary, Critical Care, and Sleep Medicine   [id number] [place] [address]  Phone: [phone]  Fax: [phone]    History of Present Illness   Jose is a 35-year-old male with past medical history of asthma presenting as a follow up.      Pulmonary history:   Usually seen by Dr. Avila in clinic. Diagnosis of asthma based on clinical phenotype, obstruction with incomplete BD response on PFTs. At the time, FeNO of 19, no elevated AEC. Has been on Breo Elipta (ICS/LABA) and Montelukast as his maintenance regimen and has been doing very well until recently- last seen in clinic in 09/2024 at which point in time his symptoms were not well controlled. Ended up being on a long steroid taper until about the beginning of October. This was in the setting of a sick contact, and seasonal change.     Ever since then he has been doing well- no exacerbations or ED visits thereafter. Has been on Trelegy and as needed albuterol as well as Singulair. Uses his PRN about 2-3 times per month at this point. Has been able to function normally at work. Walks his dogs several times per week and does not feel limited.    Seen allergy recently and allergy panel completed- sees them again Monday, with discussion to start a biologic at the time. Also has reflux- on a PPI, plan for a repeat EGD end of the month- some suspicion for eosinophilic esophagitis.    Has been removing the carpetting at home; has an air purifier that seems to help. No nocturnal awakenings, no significant cough, no constitutional features.     Review of Systems  Review of Systems   Constitutional:  Negative for activity change.   HENT:  Negative for congestion.    Eyes:  Negative for discharge.   Respiratory:  Negative for apnea.    Cardiovascular:  Negative for chest pain.   Gastrointestinal:  Negative for abdominal distention.   Endocrine: Negative for cold intolerance.   Musculoskeletal:   Negative for joint swelling.   Hematological:  Negative for adenopathy.   Psychiatric/Behavioral:  Negative for confusion.          Past Medical History   No past medical history on file.      Immunizations     Immunization History   Administered Date(s) Administered    Flu vaccine (IIV4), preservative free *Check age/dose* 09/24/2021, 10/29/2022    Flu vaccine, trivalent, preservative free, age 6 months and greater (Fluarix/Fluzone/Flulaval) 10/17/2024    Hepatitis B vaccine, 19 yrs and under (RECOMBIVAX, ENGERIX) 10/12/2012, 12/03/2012    Influenza, Unspecified 01/02/2001, 10/13/2023    Influenza, injectable, quadrivalent 02/25/2019, 03/11/2020    Meningococcal ACWY-D (Menactra) 4-valent conjugate vaccine 04/06/2007    Moderna SARS-CoV-2 Vaccination 12/30/2020, 01/27/2021, 12/21/2021    Tdap vaccine, age 7 year and older (BOOSTRIX, ADACEL) 12/03/2012, 10/04/2016, 03/09/2021    Varicella vaccine, subcutaneous (VARIVAX) 03/28/1996, 10/31/2011       Medications and Allergies     Current Outpatient Medications   Medication Instructions    albuterol 90 mcg/actuation inhaler 2 puffs, Every 6 hours PRN    albuterol 1.25 mg, nebulization, Every 6 hours PRN    amLODIPine-benazepriL (LotreL) 5-10 mg capsule 1 capsule, oral, Daily    atorvastatin (LIPITOR) 10 mg, oral, Daily    busPIRone (BUSPAR) 10 mg, 3 times daily    clotrimazole (Lotrimin) 1 % external solution Apply 4 drops to affected ear 3 times daily for 10 days    clotrimazole-betamethasone (Lotrisone) cream Apply a thin layer to ear canal entrance twice daily for 10 days    famotidine (PEPCID) 20 mg, Daily    fluticasone (Flonase) 50 mcg/actuation nasal spray 1 spray, Each Nostril, Daily RT    fluticasone-umeclidin-vilanter (Trelegy Ellipta) 200-62.5-25 mcg blister with device 1 puff, inhalation, Daily    hydrOXYzine HCL (ATARAX) 25 mg, Daily PRN    montelukast (SINGULAIR) 10 mg, oral, Nightly    multivitamin tablet 1 tablet, Daily    sertraline (ZOLOFT) 100 mg,  Daily        Allergies:  Patient has no known allergies.    Social History   He reports that he has quit smoking. His smoking use included cigarettes. He has been exposed to tobacco smoke. He has never used smokeless tobacco. He reports current alcohol use. He reports that he does not use drugs.    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      Surgical History   He has a past surgical history that includes Tympanostomy tube placement (09/20/2016) and Mouth surgery (09/20/2016).    Physical Exam   There were no vitals filed for this visit.      Physical Exam  General: Awake and alert. In no acute distress.   Eyes: PERRL. Clear sclera.  ENT: Neck supple. Mucus membranes moist.  Neck: Trachea midline. No JVD.   Respiratory: Equal air entry bilaterally. No added sounds.  Cardiovascular: Regular rate and rhythm. S1S2 heard.  Gastrointestinal: Soft, non-distended.   Neurological: Awake and alert.   Skin: Warm and dry. No rash.  Extremities: No pedal edema.    Results   Pulmonary Function Tests:  PFTs 12/2022: FEV1/FVC 0.65, FEV1 - 2.93L, 70% of predicted, partial BD response (volume criteria met, % criteria was not), FeNo 19, the TLC, RV, and DLCO were all >LLN    Chest Radiograph:  XR chest 2 views 09/13/2024    Narrative  STUDY:  Chest Radiographs;  9/13/2024 5:36PM  INDICATION:  Shortness of breath, tightness in chest.  COMPARISON:  9/10/2024 XR Chest, 3/19/2023 XR Chest  ACCESSION NUMBER(S):  [id number]  ORDERING CLINICIAN:  ARTEM STOUT  TECHNIQUE:  Frontal and lateral chest.  FINDINGS:  CARDIOMEDIASTINAL SILHOUETTE:  Cardiomediastinal silhouette is normal in size and configuration.    LUNGS:  Lungs are clear. No effusions or pneumothorax.    ABDOMEN:  No remarkable upper abdominal findings.    BONES:  No acute osseous changes. Degenerative changes in the thoracic  spine.    Impression  No acute abnormalities  Signed by Beau Reynolds MD      Chest CT Scan:  CT angio chest for pulmonary embolism 09/23/2024    Narrative  Interpreted By:  Schoenberger, Joseph,  STUDY:  CT ANGIO CHEST FOR PULMONARY EMBOLISM;  9/23/2024 10:56 am    INDICATION:  Signs/Symptoms:PIERRE after COVID.    ,J45.909 Unspecified asthma, uncomplicated (HHS-HCC),R06.02 Shortness  of breath    COMPARISON:  None.    ACCESSION NUMBER(S):  [id number]    ORDERING CLINICIAN:  BELTRAN WEBER    TECHNIQUE:  Helical data acquisition of the chest was obtained with the  intravenous injection of 75 cc Omnipaque 350. Images were reformatted  in coronal and sagittal planes. Axial and coronal MIP images were  created and reviewed.    FINDINGS:  POTENTIAL LIMITATIONS OF THE STUDY: None    HEART AND VESSELS:  No discrete filling defects within the main pulmonary artery or its  branches.    Main pulmonary artery and its branches are normal in caliber.    The thoracic aorta is normal in course and caliber with no  significant atherosclerotic calcifications and normal luminal  opacification without evidence for dissection.    No coronary artery calcifications are seen.The study is not optimized  for evaluation of coronary arteries.    The cardiac chambers are not enlarged.    No evidence of pericardial effusion.    MEDIASTINUM AND MEAGAN, LOWER NECK AND AXILLA:  The visualized thyroid gland is within normal limits.    No evidence of thoracic lymphadenopathy by CT criteria.    Esophagus appears within normal limits as seen.    LUNGS AND AIRWAYS:  The trachea and central airways are patent. No endobronchial lesion.    There is a bandlike opacity in the right middle lobe consistent with  area of platelike atelectasis or postinflammatory scarring. There is  no pleural effusion or pneumothorax or other significant focal lung  opacity.    UPPER ABDOMEN:  The visualized subdiaphragmatic structures demonstrate no  remarkable  findings.    CHEST WALL AND OSSEOUS STRUCTURES:  Chest wall soft tissues appear normal.    Impression  1.  No evidence of acute pathology.      MACRO:  None    Signed by: Joseph Schoenberger 9/23/2024 1:15 PM  Dictation workstation:   [id number]       ECHO:  No results found for this or any previous visit from the past 365 days.       Labs:  Lab Results   Component Value Date    WBC 6.5 01/09/2025    HGB 17.9 (H) 01/09/2025    HCT 52.0 01/09/2025    MCV 92 01/09/2025     01/09/2025       Lab Results   Component Value Date    CREATININE 0.93 09/13/2024    BUN 14 09/13/2024     09/13/2024    K 4.2 09/13/2024     09/13/2024    CO2 24 09/13/2024      AEC:   Eosinophils Absolute (x10*3/uL)   Date Value   01/09/2025 0.24     Eosinophils % (%)   Date Value   01/09/2025 3.7          Assessment and Plan   Jose is a 35-year-old male with past medical history of asthma presenting as a follow up.      #Asthma:  Obstruction on PFTs with an incomplete BD response; no elevated AEC at any point. FeNO of 19 when done last. MMRC of 1. Better controlled currently. On maintenance with Trelegy and on PRN albuterol as well as monteleukast.     - Continue maintenance therapy with Trelegy, Montelukast  - Continue PRN Albuterol (ABIGAIL)   - Continue Flonase and PPI  - Allergy appointment as scheduled- also discuss concern for possible eosinophilic esophagitis  - RTC in 3 months or sooner if indicated     Patient seen and discussed with Dr. Vinson.      Leticia Potts MD  Fellow  Pulmonary and Critical Care

## 2025-02-06 ENCOUNTER — OFFICE VISIT (OUTPATIENT)
Dept: PULMONOLOGY | Facility: HOSPITAL | Age: 35
End: 2025-02-06
Payer: COMMERCIAL

## 2025-02-06 VITALS
SYSTOLIC BLOOD PRESSURE: 143 MMHG | BODY MASS INDEX: 31.79 KG/M2 | TEMPERATURE: 93.9 F | OXYGEN SATURATION: 95 % | HEART RATE: 74 BPM | DIASTOLIC BLOOD PRESSURE: 84 MMHG | WEIGHT: 209.1 LBS

## 2025-02-06 DIAGNOSIS — J45.909 ASTHMA, UNSPECIFIED ASTHMA SEVERITY, UNSPECIFIED WHETHER COMPLICATED, UNSPECIFIED WHETHER PERSISTENT (HHS-HCC): Primary | ICD-10-CM

## 2025-02-06 PROCEDURE — 99214 OFFICE O/P EST MOD 30 MIN: CPT | Performed by: STUDENT IN AN ORGANIZED HEALTH CARE EDUCATION/TRAINING PROGRAM

## 2025-02-06 PROCEDURE — 3077F SYST BP >= 140 MM HG: CPT | Performed by: STUDENT IN AN ORGANIZED HEALTH CARE EDUCATION/TRAINING PROGRAM

## 2025-02-06 PROCEDURE — 99214 OFFICE O/P EST MOD 30 MIN: CPT | Mod: GC | Performed by: STUDENT IN AN ORGANIZED HEALTH CARE EDUCATION/TRAINING PROGRAM

## 2025-02-06 PROCEDURE — 3079F DIAST BP 80-89 MM HG: CPT | Performed by: STUDENT IN AN ORGANIZED HEALTH CARE EDUCATION/TRAINING PROGRAM

## 2025-02-06 PROCEDURE — 1036F TOBACCO NON-USER: CPT | Performed by: STUDENT IN AN ORGANIZED HEALTH CARE EDUCATION/TRAINING PROGRAM

## 2025-02-06 ASSESSMENT — ENCOUNTER SYMPTOMS
CONFUSION: 0
LOSS OF SENSATION IN FEET: 0
ACTIVITY CHANGE: 0
OCCASIONAL FEELINGS OF UNSTEADINESS: 0
DEPRESSION: 0
EYE DISCHARGE: 0
JOINT SWELLING: 0
ADENOPATHY: 0
ABDOMINAL DISTENTION: 0
APNEA: 0

## 2025-02-06 ASSESSMENT — PAIN SCALES - GENERAL: PAINLEVEL_OUTOF10: 0-NO PAIN

## 2025-02-06 NOTE — PATIENT INSTRUCTIONS
Thank you for coming into the clinic today. It was a pleasure to see you!     Today we discussed the following:   - Your asthma which is doing a lot better today!    The plan going forward is as follows:   Continue the following medications:   - Trelegy and PRN albuterol  - Singulair  - Your antacid and Flonase    Follow-up with other practitioners:  - Allergy as scheduled    Please follow up with me in: 3 months    If you have any further questions feel free to call my office at [phone] (choose #2 to reach a pulmonary nurse) and please allow 1-2 business days for a response.     If you have any scheduling needs feel free to call [phone] (for breathing or walking test), [phone] (for EKG's, echocardiograms or cardiopulmonary stress test), and [phone] (for radiology tests such as CT scan, MRIs and nuclear medicine tests).    Leticia Potts  Pulmonary and Critical Care Fellow     [address] [place] [address]

## 2025-02-10 ENCOUNTER — APPOINTMENT (OUTPATIENT)
Dept: ALLERGY | Facility: CLINIC | Age: 35
End: 2025-02-10
Payer: COMMERCIAL

## 2025-02-10 VITALS
BODY MASS INDEX: 30.62 KG/M2 | SYSTOLIC BLOOD PRESSURE: 120 MMHG | RESPIRATION RATE: 17 BRPM | OXYGEN SATURATION: 97 % | WEIGHT: 202 LBS | HEART RATE: 71 BPM | TEMPERATURE: 97.9 F | HEIGHT: 68 IN | DIASTOLIC BLOOD PRESSURE: 78 MMHG

## 2025-02-10 DIAGNOSIS — D72.19 PERIPHERAL EOSINOPHILIA: ICD-10-CM

## 2025-02-10 DIAGNOSIS — R13.10 DYSPHAGIA, UNSPECIFIED TYPE: ICD-10-CM

## 2025-02-10 DIAGNOSIS — D80.6 ANTI-POLYSACCHARIDE ANTIBODY DEFICIENCY (MULTI): ICD-10-CM

## 2025-02-10 DIAGNOSIS — B99.9 CHRONIC INFECTION: ICD-10-CM

## 2025-02-10 DIAGNOSIS — K21.00 GASTROESOPHAGEAL REFLUX DISEASE WITH ESOPHAGITIS WITHOUT HEMORRHAGE: ICD-10-CM

## 2025-02-10 DIAGNOSIS — J45.50 SEVERE PERSISTENT ASTHMA WITHOUT COMPLICATION (MULTI): Primary | ICD-10-CM

## 2025-02-10 PROCEDURE — 90732 PPSV23 VACC 2 YRS+ SUBQ/IM: CPT | Performed by: ALLERGY & IMMUNOLOGY

## 2025-02-10 PROCEDURE — 99214 OFFICE O/P EST MOD 30 MIN: CPT | Performed by: ALLERGY & IMMUNOLOGY

## 2025-02-10 PROCEDURE — 3078F DIAST BP <80 MM HG: CPT | Performed by: ALLERGY & IMMUNOLOGY

## 2025-02-10 PROCEDURE — 3008F BODY MASS INDEX DOCD: CPT | Performed by: ALLERGY & IMMUNOLOGY

## 2025-02-10 PROCEDURE — 3074F SYST BP LT 130 MM HG: CPT | Performed by: ALLERGY & IMMUNOLOGY

## 2025-02-10 PROCEDURE — 90471 IMMUNIZATION ADMIN: CPT | Performed by: ALLERGY & IMMUNOLOGY

## 2025-02-10 NOTE — PROGRESS NOTES
Patient ID: Jose Zavala is a 35 y.o. male.     Chief Complaint: follow up  History Of Present Illness  Jose Zavala is a 35 y.o. male with PMx asthma and allergy, recurrent infections, GERD/dysphagia presenting for follow up.     Food Allergy  No    Eczema/ Atopic Dermatitis  No    Asthma  Trelegy 200  No recent steroid  Considering Biologic    Rhinoconjunctivitis  Doing ok with avoidance and medications at this time    Drug Allergy   NO    Insect Allergy   No    Infections  Not since last visit  Low antibody titers with normal IG's      Review of Systems    Pertinent positives and negatives have been assessed in the HPI. All other systems have been reviewed and are negative except as noted in the HPI.    Allergies  Patient has no known allergies.    Past Medical History  He has no past medical history on file.    Family History  No family history on file.        Surgical History  He has a past surgical history that includes Tympanostomy tube placement (09/20/2016) and Mouth surgery (09/20/2016).    Social/Environmental History  He reports that he has quit smoking. His smoking use included cigarettes. He has been exposed to tobacco smoke. He has never used smokeless tobacco. He reports current alcohol use. He reports that he does not use drugs.        MEDICATIONS  Current Outpatient Medications on File Prior to Visit   Medication Sig Dispense Refill    albuterol 1.25 mg/3 mL nebulizer solution Take 3 mL (1.25 mg) by nebulization every 6 hours if needed for wheezing. 75 mL 11    albuterol 90 mcg/actuation inhaler Inhale 2 puffs every 6 hours if needed for wheezing.      amLODIPine-benazepriL (LotreL) 5-10 mg capsule Take 1 capsule by mouth once daily. 90 capsule 1    atorvastatin (Lipitor) 10 mg tablet Take 1 tablet (10 mg) by mouth once daily. 30 tablet 5    busPIRone (Buspar) 10 mg tablet Take 1 tablet (10 mg) by mouth 3 times a day.      clotrimazole (Lotrimin) 1 % external solution Apply 4 drops to affected  "ear 3 times daily for 10 days 6 mL 0    clotrimazole-betamethasone (Lotrisone) cream Apply a thin layer to ear canal entrance twice daily for 10 days 45 g 0    famotidine (Pepcid) 20 mg tablet Take 1 tablet (20 mg) by mouth once daily.      fluticasone (Flonase) 50 mcg/actuation nasal spray Administer 1 spray into each nostril once daily. 16 g 5    fluticasone-umeclidin-vilanter (Trelegy Ellipta) 200-62.5-25 mcg blister with device Inhale 1 puff once daily. 1 each 1    hydrOXYzine HCL (Atarax) 25 mg tablet Take 1 tablet (25 mg) by mouth once daily as needed for anxiety.      montelukast (Singulair) 10 mg tablet Take 1 tablet (10 mg) by mouth once daily at bedtime. 90 tablet 1    multivitamin tablet Take 1 tablet by mouth once daily.      sertraline (Zoloft) 100 mg tablet Take 1 tablet (100 mg) by mouth once daily.       No current facility-administered medications on file prior to visit.         Physical Exam  Visit Vitals  /78   Pulse 71   Temp 36.6 °C (97.9 °F) (Temporal)   Resp 17   Ht 1.727 m (5' 8\")   Wt 91.6 kg (202 lb)   SpO2 97%   BMI 30.71 kg/m²   Smoking Status Former   BSA 2.1 m²       Wt Readings from Last 1 Encounters:   02/10/25 91.6 kg (202 lb)       Physical Exam    General: Well appearing, no acute distress  Head: Normocephalic, atraumatic, neck supple without lymphadenopathy  Eyes: EOMI, non-injected  Nose: No nasal crease, nares patent, slightly boggy turbinates, minimal discharge  Throat: Normal dentition, no erythema  Heart: Regular rate and rhythm  Lungs: Clear to auscultation bilaterally, effort normal  Abdomen: Soft, non-tender, normal bowel sounds  Extremities: Moves all extremities symmetrically, no edema  Skin: No rashes/lesions  Psych: normal mood and affect    LAB RESULTS:  CBC:  Recent Labs     01/09/25  1514 09/13/24  1653 07/02/24  1110 05/19/23  1124   WBC 6.5 10.2 5.4 4.9   HGB 17.9* 17.5 16.7 16.4   HCT 52.0 54.3* 51.3 50.6    213 208 192   MCV 92 97 97 97   EOSABS " 0.24 0.02  --  0.05       CMP:  Recent Labs     09/13/24  1653 07/02/24  1110 01/09/24  1248    140 139   K 4.2 4.1 4.2    103 102   CO2 24 29 30   ANIONGAP 16 12 11   BUN 14 11 13   CREATININE 0.93 0.82 0.80   EGFR >90 >90 >90     Recent Labs     09/13/24  1653 07/02/24  1110 01/09/24  1248 11/06/23  1329   ALBUMIN 5.1* 4.5 4.6 4.8   ALKPHOS  --  65 64 66   ALT  --  72* 78* 87*   AST  --  31 31 35   BILITOT  --  0.6 0.4 0.6       ALLERGY:   Lab Results   Component Value Date    ICIGE 148 09/19/2024    WHITEASH 0.68 (Low) 09/19/2024    SILVERBIRCH 0.15 (Equiv IgE) 09/19/2024    BOXELDER <0.10 09/19/2024    MOUNTJUNIPER 0.17 (Equiv IgE) 09/19/2024    COTTONWOOD 0.16 (Equiv IgE) 09/19/2024    ELM 0.29 (Equiv IgE) 09/19/2024    MULBERRY <0.10 09/19/2024    PECANHICKORY <0.10 09/19/2024    MAPLESYCAMOR 0.18 (Equiv IgE) 09/19/2024    OAK 0.15 (Equiv IgE) 09/19/2024    BERMUDAGR 0.12 (Equiv IgE) 09/19/2024    JOHNSONGR 0.13 (Equiv IgE) 09/19/2024    BLUEGRASS 0.22 (Equiv IgE) 09/19/2024    TIMOTHYGRASS 0.15 (Equiv IgE) 09/19/2024     Lab Results   Component Value Date    LAMBQUART 0.16 (Equiv IgE) 09/19/2024    PIGWEED <0.10 09/19/2024    COMRAGWEED 0.27 (Equiv IgE) 09/19/2024    RUSSIANT 0.13 (Equiv IgE) 09/19/2024    SHEEPSOR 0.10 (Equiv IgE) 09/19/2024    PLANTAIN 0.15 (Equiv IgE) 09/19/2024    CATEPI <0.10 09/19/2024    DOGEPI 0.49 (Low) 09/19/2024    ALTERNA 4.78 (High) 09/19/2024    CLADHERB 1.09 (Mod) 09/19/2024    ICA04 1.64 (Mod) 09/19/2024    PENICILLIUM 0.48 (Low) 09/19/2024    DERMFAR 0.13 (Equiv IgE) 09/19/2024    DERMPTE 0.18 (Equiv IgE) 09/19/2024    COCKR <0.10 09/19/2024       Recent Labs     09/19/24  1109   ICIGE 148     Recent Labs     01/09/25  1514 09/13/24  1653 05/19/23  1124   EOSABS 0.24 0.02 0.05         IMMUNO:   Recent Labs     01/09/25  1514   IGG 1,270      *       HEME/ENDO:  Recent Labs     07/02/24  1110 05/19/23  1124   TSH 1.02 1.54   HGBA1C 5.1  --         Assessment/Plan     Jose is a 34 yo man with a history of asthma, allergy, dysphagia and recurrent infections  -continue with current asthma medications. No refills needed today. We discussed biologic therapy choices. Will make final decision after EGD.  -Discussed allergy avoidance measures. Will do ABPA panel due to significant mold allergy and history of asthma.  -Antibody titers low, will do Pneumovax with additional labs in a month  Follow up 2 months  Krystle Dumas, DO

## 2025-02-20 ENCOUNTER — ANESTHESIA (OUTPATIENT)
Dept: GASTROENTEROLOGY | Facility: EXTERNAL LOCATION | Age: 35
End: 2025-02-20

## 2025-02-20 ENCOUNTER — APPOINTMENT (OUTPATIENT)
Dept: GASTROENTEROLOGY | Facility: EXTERNAL LOCATION | Age: 35
End: 2025-02-20
Payer: COMMERCIAL

## 2025-02-20 VITALS
SYSTOLIC BLOOD PRESSURE: 114 MMHG | HEART RATE: 67 BPM | OXYGEN SATURATION: 97 % | TEMPERATURE: 97 F | WEIGHT: 205 LBS | RESPIRATION RATE: 18 BRPM | DIASTOLIC BLOOD PRESSURE: 69 MMHG | HEIGHT: 68 IN | BODY MASS INDEX: 31.07 KG/M2

## 2025-02-20 DIAGNOSIS — K31.A0 GASTRIC INTESTINAL METAPLASIA: Primary | ICD-10-CM

## 2025-02-20 DIAGNOSIS — R13.10 DYSPHAGIA: ICD-10-CM

## 2025-02-20 PROCEDURE — 43239 EGD BIOPSY SINGLE/MULTIPLE: CPT | Performed by: INTERNAL MEDICINE

## 2025-02-20 ASSESSMENT — PAIN - FUNCTIONAL ASSESSMENT
PAIN_FUNCTIONAL_ASSESSMENT: 0-10

## 2025-02-20 ASSESSMENT — COLUMBIA-SUICIDE SEVERITY RATING SCALE - C-SSRS
2. HAVE YOU ACTUALLY HAD ANY THOUGHTS OF KILLING YOURSELF?: NO
6. HAVE YOU EVER DONE ANYTHING, STARTED TO DO ANYTHING, OR PREPARED TO DO ANYTHING TO END YOUR LIFE?: NO
1. IN THE PAST MONTH, HAVE YOU WISHED YOU WERE DEAD OR WISHED YOU COULD GO TO SLEEP AND NOT WAKE UP?: NO

## 2025-02-20 ASSESSMENT — PAIN SCALES - GENERAL
PAINLEVEL_OUTOF10: 0 - NO PAIN

## 2025-02-20 NOTE — DISCHARGE INSTRUCTIONS

## 2025-02-20 NOTE — H&P
Procedure H&P    Patient Profile-Procedures  Name Jose Zavala  Date of Birth 1990  MRN [id number]  Address   [address] [id number] [facility] [address]    Primary Phone Number [phone]  Secondary Phone Number    Cayden Anderson    Procedure(s):  Procedures: EGD  Primary contact name and number   Extended Emergency Contact Information  Primary Emergency Contact: Sudhir Zavala   [place]  Mobile Phone: [phone]  Relation: Spouse    General Health  Weight   Vitals:    02/20/25 0941   Weight: 93 kg (205 lb)     BMI Body mass index is 31.17 kg/m².    Allergies  No Known Allergies    Past Medical History   Past Medical History:   Diagnosis Date    Anxiety     Asthma     Depression     GERD (gastroesophageal reflux disease)     Hyperlipidemia     Hypertension        Provider assessment  Diagnosis:  intestinal metaplasia   Medication Reviewed - yes  Prior to Admission medications    Medication Sig Start Date End Date Taking? Authorizing Provider   albuterol 1.25 mg/3 mL nebulizer solution Take 3 mL (1.25 mg) by nebulization every 6 hours if needed for wheezing. 9/19/24 9/19/25 Yes Mei Hall MD   albuterol 90 mcg/actuation inhaler Inhale 2 puffs every 6 hours if needed for wheezing.   Yes Historical Provider, MD   amLODIPine-benazepriL (LotreL) 5-10 mg capsule Take 1 capsule by mouth once daily. 10/28/24 10/28/25 Yes Christian Titus MD   atorvastatin (Lipitor) 10 mg tablet Take 1 tablet (10 mg) by mouth once daily. 12/10/24 6/8/25 Yes Christian Titus MD   busPIRone (Buspar) 10 mg tablet Take 1 tablet (10 mg) by mouth 3 times a day.   Yes Historical Provider, MD   famotidine (Pepcid) 20 mg tablet Take 1 tablet (20 mg) by mouth once daily.   Yes Historical Provider, MD   fluticasone (Flonase) 50 mcg/actuation nasal spray Administer 1 spray into each nostril once daily. 5/7/24 5/7/25 Yes Christian Titus MD   fluticasone-umeclidin-vilanter (Trelegy  Ellipta) 200-62.5-25 mcg blister with device Inhale 1 puff once daily. 1/16/25  Yes Leticia Potts MD   montelukast (Singulair) 10 mg tablet Take 1 tablet (10 mg) by mouth once daily at bedtime. 8/27/24  Yes Christian Titus MD   multivitamin tablet Take 1 tablet by mouth once daily.   Yes Historical Provider, MD   sertraline (Zoloft) 100 mg tablet Take 1 tablet (100 mg) by mouth once daily.   Yes Historical Provider, MD   hydrOXYzine HCL (Atarax) 25 mg tablet Take 1 tablet (25 mg) by mouth once daily as needed for anxiety.  Patient not taking: Reported on 2/20/2025 11/19/24   Historical Provider, MD   clotrimazole (Lotrimin) 1 % external solution Apply 4 drops to affected ear 3 times daily for 10 days 1/22/25 2/20/25  Leon Riggs MD   clotrimazole-betamethasone (Lotrisone) cream Apply a thin layer to ear canal entrance twice daily for 10 days 1/22/25 2/20/25  Leon Riggs MD       Physical Exam  Vitals:    02/20/25 0941   BP: 135/85   Pulse: 75   Resp: 16   Temp: 36 °C (96.8 °F)   SpO2: 98%        General: A&Ox3, NAD.  HEENT: AT/NC.   CV: RRR. No murmur.  Resp: CTA bilaterally. No wheezing, rhonchi or rales.   GI: Soft, NT/ND. BSx4.  Extrem: No edema. Pulses intact.  Neuro: No focal deficits.   Psych: Normal mood and affect.      Procedure Plan - pre-procedural (re)assesment completed by physician:  discharge/transfer patient when discharge criteria met    ASA status 2  Mallampati score 2    James Randall MD  2/20/2025 9:53 AM

## 2025-02-24 LAB
LABORATORY COMMENT REPORT: NORMAL
PATH REPORT.FINAL DX SPEC: NORMAL
PATH REPORT.GROSS SPEC: NORMAL
PATH REPORT.TOTAL CANCER: NORMAL

## 2025-03-07 DIAGNOSIS — J31.0 OTHER RHINITIS: ICD-10-CM

## 2025-03-07 RX ORDER — MONTELUKAST SODIUM 10 MG/1
10 TABLET ORAL NIGHTLY
Qty: 90 TABLET | Refills: 0 | Status: SHIPPED | OUTPATIENT
Start: 2025-03-07

## 2025-03-10 DIAGNOSIS — D80.6 ANTI-POLYSACCHARIDE ANTIBODY DEFICIENCY (MULTI): ICD-10-CM

## 2025-03-10 DIAGNOSIS — B99.9 CHRONIC INFECTION: ICD-10-CM

## 2025-03-10 DIAGNOSIS — R13.10 DYSPHAGIA, UNSPECIFIED TYPE: ICD-10-CM

## 2025-03-10 DIAGNOSIS — J45.50 SEVERE PERSISTENT ASTHMA WITHOUT COMPLICATION (MULTI): ICD-10-CM

## 2025-03-10 DIAGNOSIS — D72.19 PERIPHERAL EOSINOPHILIA: ICD-10-CM

## 2025-03-19 DIAGNOSIS — J45.50 SEVERE PERSISTENT ASTHMA, UNSPECIFIED WHETHER COMPLICATED (MULTI): Primary | ICD-10-CM

## 2025-03-19 LAB
A FLAVUS AB SER QL ID: NEGATIVE
A FUMIGATUS AB SER QL ID: NEGATIVE
A FUMIGATUS IGE QN: 3.2 KU/L
A FUMIGATUS IGE RAST: 2
A FUMIGATUS IGG SER-MCNC: 74.9 MCG/ML
A NIGER AB SER QL ID: NEGATIVE
IGE SERPL-ACNC: 244 KU/L
QUEST MANNAN BINDING LECTIN: 454.3 NG/ML
REF LAB TEST REF RANGE: ABNORMAL
S PN DA SERO 19F IGG SER-MCNC: 0.7 UG/ML
S PNEUM DA 1 IGG SER-MCNC: 6.4 UG/ML
S PNEUM DA 10A IGG SER-MCNC: 8 UG/ML
S PNEUM DA 11A IGG SER-MCNC: 28 UG/ML
S PNEUM DA 12F IGG SER-MCNC: 3.1 UG/ML
S PNEUM DA 14 IGG SER-MCNC: <0.3
S PNEUM DA 15B IGG SER-MCNC: 3 UG/ML
S PNEUM DA 17F IGG SER-MCNC: 1.5 UG/ML
S PNEUM DA 18C IGG SER-MCNC: 56.7
S PNEUM DA 19A IGG SER-MCNC: 100.4 UG/ML
S PNEUM DA 2 IGG SER-MCNC: 61.2 UG/ML
S PNEUM DA 20A IGG SER-MCNC: 13.6 UG/ML
S PNEUM DA 22F IGG SER-MCNC: 3.4 UG/ML
S PNEUM DA 23F IGG SER-MCNC: 10.8 UG/ML
S PNEUM DA 3 IGG SER-MCNC: 2.4 UG/ML
S PNEUM DA 33F IGG SER-MCNC: 13.7 UG/ML
S PNEUM DA 4 IGG SER-MCNC: 1.2 UG/ML
S PNEUM DA 5 IGG SER-MCNC: 14.1 UG/ML
S PNEUM DA 6B IGG SER-MCNC: 0.6 UG/ML
S PNEUM DA 7F IGG SER-MCNC: 7 UG/ML
S PNEUM DA 8 IGG SER-MCNC: 7.6 UG/ML
S PNEUM DA 9N IGG SER-MCNC: 1.7 UG/ML
S PNEUM DA 9V IGG SER-MCNC: <0.3 UG/ML

## 2025-03-20 DIAGNOSIS — J45.909 ASTHMA, UNSPECIFIED ASTHMA SEVERITY, UNSPECIFIED WHETHER COMPLICATED, UNSPECIFIED WHETHER PERSISTENT (HHS-HCC): ICD-10-CM

## 2025-03-24 ENCOUNTER — APPOINTMENT (OUTPATIENT)
Dept: ALLERGY | Facility: CLINIC | Age: 35
End: 2025-03-24
Payer: COMMERCIAL

## 2025-03-24 VITALS
HEART RATE: 90 BPM | OXYGEN SATURATION: 94 % | WEIGHT: 207 LBS | SYSTOLIC BLOOD PRESSURE: 130 MMHG | BODY MASS INDEX: 31.47 KG/M2 | DIASTOLIC BLOOD PRESSURE: 85 MMHG | TEMPERATURE: 98.4 F | RESPIRATION RATE: 18 BRPM

## 2025-03-24 DIAGNOSIS — J30.1 SEASONAL ALLERGIC RHINITIS DUE TO POLLEN: ICD-10-CM

## 2025-03-24 DIAGNOSIS — J45.50 SEVERE PERSISTENT ASTHMA WITHOUT COMPLICATION (MULTI): ICD-10-CM

## 2025-03-24 DIAGNOSIS — J30.2 SEASONAL ALLERGIC RHINITIS DUE TO FUNGAL SPORES: Primary | ICD-10-CM

## 2025-03-24 PROCEDURE — 3079F DIAST BP 80-89 MM HG: CPT | Performed by: ALLERGY & IMMUNOLOGY

## 2025-03-24 PROCEDURE — 96372 THER/PROPH/DIAG INJ SC/IM: CPT | Performed by: ALLERGY & IMMUNOLOGY

## 2025-03-24 PROCEDURE — 99214 OFFICE O/P EST MOD 30 MIN: CPT | Performed by: ALLERGY & IMMUNOLOGY

## 2025-03-24 PROCEDURE — 3075F SYST BP GE 130 - 139MM HG: CPT | Performed by: ALLERGY & IMMUNOLOGY

## 2025-03-24 ASSESSMENT — ENCOUNTER SYMPTOMS
CONSTITUTIONAL NEGATIVE: 1
GASTROINTESTINAL NEGATIVE: 1
EYES NEGATIVE: 1
RESPIRATORY NEGATIVE: 1
ENDOCRINE NEGATIVE: 1
MUSCULOSKELETAL NEGATIVE: 1

## 2025-03-24 NOTE — PROGRESS NOTES
Patient ID: Jose Zavala is a 35 y.o. male.     Chief Complaint: follow up  History Of Present Illness  Jose Zavala is a 35 y.o. male with PMx asthma and allergy, recurrent infections, GERD/dysphagia presenting for follow up.     Food Allergy  No    Eczema/ Atopic Dermatitis  No    Asthma  Trelegy 200  Would like to plan to start biologic due to history of recurrent exacerbations and need for steroids.    Rhinoconjunctivitis  Doing ok with avoidance and medications at this time    Drug Allergy   NO    Insect Allergy   No    Infections  Not since last visit  Low antibody titers with normal IG's      Review of Systems   Constitutional: Negative.    HENT:  Positive for dental problem.    Eyes: Negative.    Respiratory: Negative.     Gastrointestinal: Negative.    Endocrine: Negative.    Musculoskeletal: Negative.    Skin: Negative.    Tooth grinding-will now use     Pertinent positives and negatives have been assessed in the HPI. All other systems have been reviewed and are negative except as noted in the HPI.    Allergies  Patient has no known allergies.    Past Medical History  He has a past medical history of Anxiety, Asthma, Depression, GERD (gastroesophageal reflux disease), Hyperlipidemia, and Hypertension.    Family History  No family history on file.        Surgical History  He has a past surgical history that includes Tympanostomy tube placement (09/20/2016); Mouth surgery (09/20/2016); and Esophagogastroduodenoscopy.    Social/Environmental History  He reports that he has quit smoking. His smoking use included cigarettes. He has been exposed to tobacco smoke. He has never used smokeless tobacco. He reports current alcohol use of about 7.0 standard drinks of alcohol per week. He reports that he does not use drugs.        MEDICATIONS  Current Outpatient Medications on File Prior to Visit   Medication Sig Dispense Refill    albuterol 1.25 mg/3 mL nebulizer solution Take 3 mL (1.25 mg) by  nebulization every 6 hours if needed for wheezing. 75 mL 11    albuterol 90 mcg/actuation inhaler Inhale 2 puffs every 6 hours if needed for wheezing.      amLODIPine-benazepriL (LotreL) 5-10 mg capsule Take 1 capsule by mouth once daily. 90 capsule 1    atorvastatin (Lipitor) 10 mg tablet Take 1 tablet (10 mg) by mouth once daily. 30 tablet 5    busPIRone (Buspar) 10 mg tablet Take 1 tablet (10 mg) by mouth 3 times a day.      famotidine (Pepcid) 20 mg tablet Take 1 tablet (20 mg) by mouth once daily.      fluticasone (Flonase) 50 mcg/actuation nasal spray Administer 1 spray into each nostril once daily. 16 g 5    fluticasone-umeclidin-vilanter (Trelegy Ellipta) 200-62.5-25 mcg blister with device Inhale 1 puff once daily. 1 each 1    hydrOXYzine HCL (Atarax) 25 mg tablet Take 1 tablet (25 mg) by mouth once daily as needed for anxiety. (Patient not taking: Reported on 2/20/2025)      montelukast (Singulair) 10 mg tablet TAKE ONE TABLET BY MOUTH EVERY day at bedtime. 90 tablet 0    multivitamin tablet Take 1 tablet by mouth once daily.      sertraline (Zoloft) 100 mg tablet Take 1 tablet (100 mg) by mouth once daily.       No current facility-administered medications on file prior to visit.         Physical Exam  Visit Vitals  /85   Pulse 90   Temp 36.9 °C (98.4 °F)   Resp 18   Wt 93.9 kg (207 lb)   SpO2 94%   BMI 31.47 kg/m²   Smoking Status Former   BSA 2.12 m²       Wt Readings from Last 1 Encounters:   03/24/25 93.9 kg (207 lb)       Physical Exam    General: Well appearing, no acute distress  Head: Normocephalic, atraumatic, neck supple without lymphadenopathy  Eyes: EOMI, non-injected  Nose: No nasal crease, nares patent, slightly boggy turbinates, minimal discharge  Throat: Normal dentition, no erythema  Heart: Regular rate and rhythm  Lungs: Clear to auscultation bilaterally, effort normal  Abdomen: Soft, non-tender, normal bowel sounds  Extremities: Moves all extremities symmetrically, no edema  Skin:  No rashes/lesions  Psych: normal mood and affect    LAB RESULTS:  CBC:  Recent Labs     01/09/25  1514 09/13/24  1653 07/02/24  1110 05/19/23  1124   WBC 6.5 10.2 5.4 4.9   HGB 17.9* 17.5 16.7 16.4   HCT 52.0 54.3* 51.3 50.6    213 208 192   MCV 92 97 97 97   EOSABS 0.24 0.02  --  0.05       CMP:  Recent Labs     09/13/24 1653 07/02/24  1110 01/09/24  1248    140 139   K 4.2 4.1 4.2    103 102   CO2 24 29 30   ANIONGAP 16 12 11   BUN 14 11 13   CREATININE 0.93 0.82 0.80   EGFR >90 >90 >90     Recent Labs     09/13/24  1653 07/02/24  1110 01/09/24  1248 11/06/23  1329   ALBUMIN 5.1* 4.5 4.6 4.8   ALKPHOS  --  65 64 66   ALT  --  72* 78* 87*   AST  --  31 31 35   BILITOT  --  0.6 0.4 0.6       ALLERGY:   Lab Results   Component Value Date    ICIGE 148 09/19/2024    WHITEASH 0.68 (Low) 09/19/2024    SILVERBIRCH 0.15 (Equiv IgE) 09/19/2024    BOXELDER <0.10 09/19/2024    MOUNTJUNIPER 0.17 (Equiv IgE) 09/19/2024    COTTONWOOD 0.16 (Equiv IgE) 09/19/2024    ELM 0.29 (Equiv IgE) 09/19/2024    MULBERRY <0.10 09/19/2024    PECANHICKORY <0.10 09/19/2024    MAPLESYCAMOR 0.18 (Equiv IgE) 09/19/2024    OAK 0.15 (Equiv IgE) 09/19/2024    BERMUDAGR 0.12 (Equiv IgE) 09/19/2024    JOHNSONGR 0.13 (Equiv IgE) 09/19/2024    BLUEGRASS 0.22 (Equiv IgE) 09/19/2024    TIMOTHYGRASS 0.15 (Equiv IgE) 09/19/2024     Lab Results   Component Value Date    [facility] 09/19/2024    PIGWEED <0.10 09/19/2024    COMRAGWEED 0.27 (Equiv IgE) 09/19/2024    RUSSIANT 0.13 (Equiv IgE) 09/19/2024    SHEEPSOR 0.10 (Equiv IgE) 09/19/2024    PLANTAIN 0.15 (Equiv IgE) 09/19/2024    CATEPI <0.10 09/19/2024    DOGEPI 0.49 (Low) 09/19/2024    ALTERNA 4.78 (High) 09/19/2024    CLADHERB 1.09 (Mod) 09/19/2024    ICA04 3.20 (H) 03/11/2025    ICA04 1.64 (Mod) 09/19/2024    PENICILLIUM 0.48 (Low) 09/19/2024    DERMFAR 0.13 (Equiv IgE) 09/19/2024    DERMPTE 0.18 (Equiv IgE) 09/19/2024    COCKR <0.10 09/19/2024       Recent Labs      09/19/24  1109   ICIGE 148     Recent Labs     01/09/25  1514 09/13/24  1653 05/19/23  1124   EOSABS 0.24 0.02 0.05         IMMUNO:   Recent Labs     01/09/25  1514   IGG 1,270      *       HEME/ENDO:  Recent Labs     07/02/24  1110 05/19/23  1124   TSH 1.02 1.54   HGBA1C 5.1  --        Assessment/Plan     Jose is a 34 yo man with a history of asthma, allergy, dysphagia and recurrent infections. Review of normal GI scope with patient.  -continue with current asthma medications. No refills needed today. We discussed biologic therapy choices. First Tezspire today. Follow up one month.  -Discussed allergy avoidance measures. Will plan repeat ABPA in a couple months.  -Repeat titers s/p pneumovax improved.  Follow up 2 months  Krystle Dumas DO

## 2025-03-25 DIAGNOSIS — J45.909 ASTHMA, UNSPECIFIED ASTHMA SEVERITY, UNSPECIFIED WHETHER COMPLICATED, UNSPECIFIED WHETHER PERSISTENT (HHS-HCC): ICD-10-CM

## 2025-03-25 PROCEDURE — RXMED WILLOW AMBULATORY MEDICATION CHARGE

## 2025-03-25 RX ORDER — FLUTICASONE FUROATE, UMECLIDINIUM BROMIDE AND VILANTEROL TRIFENATATE 200; 62.5; 25 UG/1; UG/1; UG/1
1 POWDER RESPIRATORY (INHALATION) DAILY
Qty: 1 EACH | Refills: 11 | Status: SHIPPED | OUTPATIENT
Start: 2025-03-25 | End: 2025-03-26 | Stop reason: SDUPTHER

## 2025-03-26 ENCOUNTER — APPOINTMENT (OUTPATIENT)
Dept: AUDIOLOGY | Facility: CLINIC | Age: 35
End: 2025-03-26
Payer: COMMERCIAL

## 2025-03-26 ENCOUNTER — APPOINTMENT (OUTPATIENT)
Dept: OTOLARYNGOLOGY | Facility: CLINIC | Age: 35
End: 2025-03-26
Payer: COMMERCIAL

## 2025-03-26 DIAGNOSIS — J45.50 SEVERE PERSISTENT ASTHMA WITHOUT COMPLICATION (MULTI): Primary | ICD-10-CM

## 2025-03-26 DIAGNOSIS — J45.909 ASTHMA, UNSPECIFIED ASTHMA SEVERITY, UNSPECIFIED WHETHER COMPLICATED, UNSPECIFIED WHETHER PERSISTENT (HHS-HCC): ICD-10-CM

## 2025-03-26 RX ORDER — FLUTICASONE FUROATE, UMECLIDINIUM BROMIDE AND VILANTEROL TRIFENATATE 200; 62.5; 25 UG/1; UG/1; UG/1
1 POWDER RESPIRATORY (INHALATION) DAILY
Qty: 180 EACH | Refills: 3 | Status: SHIPPED | OUTPATIENT
Start: 2025-03-26

## 2025-03-26 RX ORDER — TEZEPELUMAB-EKKO 210 MG/1.9ML
210 INJECTION, SOLUTION SUBCUTANEOUS
Qty: 1.91 ML | Refills: 11 | Status: SHIPPED | OUTPATIENT
Start: 2025-03-26 | End: 2026-03-26

## 2025-03-27 ENCOUNTER — SPECIALTY PHARMACY (OUTPATIENT)
Dept: PHARMACY | Facility: CLINIC | Age: 35
End: 2025-03-27

## 2025-04-01 PROCEDURE — RXMED WILLOW AMBULATORY MEDICATION CHARGE

## 2025-04-02 RX ORDER — FLUTICASONE FUROATE, UMECLIDINIUM BROMIDE AND VILANTEROL TRIFENATATE 200; 62.5; 25 UG/1; UG/1; UG/1
1 POWDER RESPIRATORY (INHALATION) DAILY
Qty: 1 EACH | Refills: 5 | Status: SHIPPED | OUTPATIENT
Start: 2025-04-02

## 2025-04-04 PROCEDURE — RXMED WILLOW AMBULATORY MEDICATION CHARGE

## 2025-04-05 ENCOUNTER — PHARMACY VISIT (OUTPATIENT)
Dept: PHARMACY | Facility: CLINIC | Age: 35
End: 2025-04-05
Payer: COMMERCIAL

## 2025-04-09 ENCOUNTER — APPOINTMENT (OUTPATIENT)
Dept: OTOLARYNGOLOGY | Facility: CLINIC | Age: 35
End: 2025-04-09
Payer: COMMERCIAL

## 2025-04-09 ENCOUNTER — APPOINTMENT (OUTPATIENT)
Dept: AUDIOLOGY | Facility: CLINIC | Age: 35
End: 2025-04-09
Payer: COMMERCIAL

## 2025-04-10 ENCOUNTER — SPECIALTY PHARMACY (OUTPATIENT)
Dept: PHARMACY | Facility: CLINIC | Age: 35
End: 2025-04-10

## 2025-04-16 ENCOUNTER — PHARMACY VISIT (OUTPATIENT)
Dept: PHARMACY | Facility: CLINIC | Age: 35
End: 2025-04-16
Payer: COMMERCIAL

## 2025-04-16 ASSESSMENT — PROMIS GLOBAL HEALTH SCALE
CARRYOUT_SOCIAL_ACTIVITIES: VERY GOOD
RATE_AVERAGE_PAIN: 0
RATE_QUALITY_OF_LIFE: VERY GOOD
RATE_MENTAL_HEALTH: GOOD
EMOTIONAL_PROBLEMS: RARELY
RATE_SOCIAL_SATISFACTION: VERY GOOD
RATE_GENERAL_HEALTH: GOOD
CARRYOUT_PHYSICAL_ACTIVITIES: COMPLETELY
RATE_PHYSICAL_HEALTH: GOOD

## 2025-04-17 ENCOUNTER — SPECIALTY PHARMACY (OUTPATIENT)
Dept: PHARMACY | Facility: CLINIC | Age: 35
End: 2025-04-17

## 2025-04-17 ENCOUNTER — APPOINTMENT (OUTPATIENT)
Dept: PRIMARY CARE | Facility: CLINIC | Age: 35
End: 2025-04-17
Payer: COMMERCIAL

## 2025-04-17 VITALS
DIASTOLIC BLOOD PRESSURE: 84 MMHG | TEMPERATURE: 98.4 F | HEIGHT: 68 IN | HEART RATE: 67 BPM | SYSTOLIC BLOOD PRESSURE: 120 MMHG | BODY MASS INDEX: 31.22 KG/M2 | WEIGHT: 206 LBS | OXYGEN SATURATION: 98 %

## 2025-04-17 DIAGNOSIS — R74.8 INCREASED LIVER ENZYMES: ICD-10-CM

## 2025-04-17 DIAGNOSIS — K31.9 GASTROPATHY: ICD-10-CM

## 2025-04-17 DIAGNOSIS — E78.2 MIXED HYPERLIPIDEMIA: ICD-10-CM

## 2025-04-17 DIAGNOSIS — J31.0 OTHER RHINITIS: ICD-10-CM

## 2025-04-17 DIAGNOSIS — I10 HYPERTENSION, UNSPECIFIED TYPE: ICD-10-CM

## 2025-04-17 DIAGNOSIS — J45.50 SEVERE PERSISTENT EXTRINSIC ASTHMA WITHOUT COMPLICATION (MULTI): ICD-10-CM

## 2025-04-17 DIAGNOSIS — Z00.00 WELLNESS EXAMINATION: ICD-10-CM

## 2025-04-17 DIAGNOSIS — J45.50: Primary | ICD-10-CM

## 2025-04-17 PROCEDURE — 99213 OFFICE O/P EST LOW 20 MIN: CPT | Performed by: INTERNAL MEDICINE

## 2025-04-17 PROCEDURE — 3008F BODY MASS INDEX DOCD: CPT | Performed by: INTERNAL MEDICINE

## 2025-04-17 PROCEDURE — 3079F DIAST BP 80-89 MM HG: CPT | Performed by: INTERNAL MEDICINE

## 2025-04-17 PROCEDURE — 3074F SYST BP LT 130 MM HG: CPT | Performed by: INTERNAL MEDICINE

## 2025-04-17 PROCEDURE — RXMED WILLOW AMBULATORY MEDICATION CHARGE

## 2025-04-17 RX ORDER — MONTELUKAST SODIUM 10 MG/1
10 TABLET ORAL NIGHTLY
Qty: 90 TABLET | Refills: 1 | Status: SHIPPED | OUTPATIENT
Start: 2025-04-17

## 2025-04-17 RX ORDER — ATORVASTATIN CALCIUM 10 MG/1
10 TABLET, FILM COATED ORAL DAILY
Qty: 90 TABLET | Refills: 2 | Status: SHIPPED | OUTPATIENT
Start: 2025-04-17 | End: 2025-10-14

## 2025-04-17 RX ORDER — AMLODIPINE AND BENAZEPRIL HYDROCHLORIDE 5; 10 MG/1; MG/1
1 CAPSULE ORAL DAILY
Qty: 90 CAPSULE | Refills: 1 | Status: SHIPPED | OUTPATIENT
Start: 2025-04-17 | End: 2026-04-17

## 2025-04-17 RX ORDER — FLUTICASONE PROPIONATE 50 MCG
1 SPRAY, SUSPENSION (ML) NASAL DAILY
Qty: 16 G | Refills: 11 | Status: SHIPPED | OUTPATIENT
Start: 2025-04-17 | End: 2026-04-17

## 2025-04-17 RX ORDER — FLUTICASONE PROPIONATE 50 MCG
1 SPRAY, SUSPENSION (ML) NASAL
Qty: 16 G | Refills: 5 | Status: SHIPPED | OUTPATIENT
Start: 2025-04-17 | End: 2026-04-17

## 2025-04-17 ASSESSMENT — PATIENT HEALTH QUESTIONNAIRE - PHQ9: 2. FEELING DOWN, DEPRESSED OR HOPELESS: NOT AT ALL

## 2025-04-17 NOTE — PROGRESS NOTES
"Subjective   Patient ID: Jose Zavala is a 35 y.o. male who presents for Annual Exam.    HPI The patient is doing well , feels well, no specific complaints.   Tolerating and taking med's with no significant side effects.   No other issues noted on questions.     Review of Systems    Objective   /84 (BP Location: Left arm, Patient Position: Sitting, BP Cuff Size: Adult)   Pulse 67   Temp 36.9 °C (98.4 °F) (Temporal)   Ht 1.727 m (5' 8\")   Wt 93.4 kg (206 lb)   SpO2 98%   BMI 31.32 kg/m²     Physical Exam  NAD  CARD RRR  PULM CTA  ABD NEG   EXT  NL    Assessment/Plan   Diagnoses and all orders for this visit:  Asthma, well controlled, severe persistent (Multi)  Comments:  see pulm see allergy  Orders:  -     US abdomen limited liver; Future  -     fluticasone (Flonase) 50 mcg/actuation nasal spray; Administer 1 spray into each nostril once daily. Shake gently. Before first use, prime pump. After use, clean tip and replace cap.  -     Hepatic Function Panel; Future  Severe persistent extrinsic asthma without complication (Multi)  -     US abdomen limited liver; Future  -     fluticasone (Flonase) 50 mcg/actuation nasal spray; Administer 1 spray into each nostril once daily. Shake gently. Before first use, prime pump. After use, clean tip and replace cap.  -     Hepatic Function Panel; Future  Gastropathy  -     US abdomen limited liver; Future  -     fluticasone (Flonase) 50 mcg/actuation nasal spray; Administer 1 spray into each nostril once daily. Shake gently. Before first use, prime pump. After use, clean tip and replace cap.  -     Hepatic Function Panel; Future  Increased liver enzymes  -     US abdomen limited liver; Future  -     fluticasone (Flonase) 50 mcg/actuation nasal spray; Administer 1 spray into each nostril once daily. Shake gently. Before first use, prime pump. After use, clean tip and replace cap.  -     Hepatic Function Panel; Future  Mixed hyperlipidemia  -     US abdomen limited " liver; Future  -     atorvastatin (Lipitor) 10 mg tablet; Take 1 tablet (10 mg) by mouth once daily.  -     fluticasone (Flonase) 50 mcg/actuation nasal spray; Administer 1 spray into each nostril once daily. Shake gently. Before first use, prime pump. After use, clean tip and replace cap.  -     Hepatic Function Panel; Future  BMI 31.0-31.9,adult  -     US abdomen limited liver; Future  -     fluticasone (Flonase) 50 mcg/actuation nasal spray; Administer 1 spray into each nostril once daily. Shake gently. Before first use, prime pump. After use, clean tip and replace cap.  -     Hepatic Function Panel; Future  Hypertension, unspecified type  Comments:  not at goal  Orders:  -     US abdomen limited liver; Future  -     atorvastatin (Lipitor) 10 mg tablet; Take 1 tablet (10 mg) by mouth once daily.  -     amLODIPine-benazepriL (LotreL) 5-10 mg capsule; Take 1 capsule by mouth once daily.  -     fluticasone (Flonase) 50 mcg/actuation nasal spray; Administer 1 spray into each nostril once daily. Shake gently. Before first use, prime pump. After use, clean tip and replace cap.  -     Hepatic Function Panel; Future  Mixed hyperlipidemia  Comments:  severe time to treat  Orders:  -     US abdomen limited liver; Future  -     atorvastatin (Lipitor) 10 mg tablet; Take 1 tablet (10 mg) by mouth once daily.  -     fluticasone (Flonase) 50 mcg/actuation nasal spray; Administer 1 spray into each nostril once daily. Shake gently. Before first use, prime pump. After use, clean tip and replace cap.  -     Hepatic Function Panel; Future  Other rhinitis  -     US abdomen limited liver; Future  -     montelukast (Singulair) 10 mg tablet; Take 1 tablet (10 mg) by mouth once daily at bedtime.  -     fluticasone (Flonase) 50 mcg/actuation nasal spray; Administer 1 spray into each nostril once daily. Shake gently. Before first use, prime pump. After use, clean tip and replace cap.  -     fluticasone (Flonase) 50 mcg/actuation nasal  spray; Administer 1 spray into each nostril once daily.  -     Hepatic Function Panel; Future  Wellness examination  -     Lipid Panel; Future  -     Hepatic Function Panel; Future  Other orders  -     Follow Up In Primary Care - Established  -     Follow Up In Primary Care - Established; Future

## 2025-04-22 ENCOUNTER — APPOINTMENT (OUTPATIENT)
Dept: AUDIOLOGY | Facility: CLINIC | Age: 35
End: 2025-04-22
Payer: COMMERCIAL

## 2025-04-22 ENCOUNTER — APPOINTMENT (OUTPATIENT)
Dept: OTOLARYNGOLOGY | Facility: CLINIC | Age: 35
End: 2025-04-22
Payer: COMMERCIAL

## 2025-04-23 DIAGNOSIS — J45.909 ASTHMA, UNSPECIFIED ASTHMA SEVERITY, UNSPECIFIED WHETHER COMPLICATED, UNSPECIFIED WHETHER PERSISTENT (HHS-HCC): ICD-10-CM

## 2025-04-24 ENCOUNTER — PHARMACY VISIT (OUTPATIENT)
Dept: PHARMACY | Facility: CLINIC | Age: 35
End: 2025-04-24
Payer: COMMERCIAL

## 2025-04-24 PROCEDURE — RXMED WILLOW AMBULATORY MEDICATION CHARGE

## 2025-04-24 RX ORDER — FLUTICASONE FUROATE, UMECLIDINIUM BROMIDE AND VILANTEROL TRIFENATATE 200; 62.5; 25 UG/1; UG/1; UG/1
1 POWDER RESPIRATORY (INHALATION) DAILY
Qty: 60 EACH | Refills: 5 | Status: SHIPPED | OUTPATIENT
Start: 2025-04-24

## 2025-04-25 ENCOUNTER — HOSPITAL ENCOUNTER (OUTPATIENT)
Dept: RADIOLOGY | Facility: CLINIC | Age: 35
End: 2025-04-25
Payer: COMMERCIAL

## 2025-04-27 ENCOUNTER — PHARMACY VISIT (OUTPATIENT)
Dept: PHARMACY | Facility: CLINIC | Age: 35
End: 2025-04-27
Payer: COMMERCIAL

## 2025-04-30 ENCOUNTER — APPOINTMENT (OUTPATIENT)
Dept: RADIOLOGY | Facility: CLINIC | Age: 35
End: 2025-04-30
Payer: COMMERCIAL

## 2025-05-01 ENCOUNTER — APPOINTMENT (OUTPATIENT)
Dept: ALLERGY | Facility: CLINIC | Age: 35
End: 2025-05-01
Payer: COMMERCIAL

## 2025-05-01 ENCOUNTER — HOSPITAL ENCOUNTER (OUTPATIENT)
Dept: RADIOLOGY | Facility: CLINIC | Age: 35
Discharge: HOME | End: 2025-05-01
Payer: COMMERCIAL

## 2025-05-01 VITALS
HEART RATE: 70 BPM | WEIGHT: 205 LBS | OXYGEN SATURATION: 97 % | TEMPERATURE: 97 F | BODY MASS INDEX: 31.07 KG/M2 | HEIGHT: 68 IN | DIASTOLIC BLOOD PRESSURE: 68 MMHG | SYSTOLIC BLOOD PRESSURE: 116 MMHG

## 2025-05-01 DIAGNOSIS — K31.9 GASTROPATHY: ICD-10-CM

## 2025-05-01 DIAGNOSIS — J45.50 SEVERE PERSISTENT ASTHMA WITHOUT COMPLICATION (MULTI): Primary | ICD-10-CM

## 2025-05-01 DIAGNOSIS — R74.8 INCREASED LIVER ENZYMES: ICD-10-CM

## 2025-05-01 DIAGNOSIS — J45.50: ICD-10-CM

## 2025-05-01 DIAGNOSIS — E78.2 MIXED HYPERLIPIDEMIA: ICD-10-CM

## 2025-05-01 DIAGNOSIS — I10 HYPERTENSION, UNSPECIFIED TYPE: ICD-10-CM

## 2025-05-01 DIAGNOSIS — J45.50 SEVERE PERSISTENT EXTRINSIC ASTHMA WITHOUT COMPLICATION (MULTI): ICD-10-CM

## 2025-05-01 DIAGNOSIS — J30.1 SEASONAL ALLERGIC RHINITIS DUE TO POLLEN: ICD-10-CM

## 2025-05-01 DIAGNOSIS — J31.0 OTHER RHINITIS: ICD-10-CM

## 2025-05-01 DIAGNOSIS — J30.2 SEASONAL ALLERGIC RHINITIS DUE TO FUNGAL SPORES: ICD-10-CM

## 2025-05-01 PROCEDURE — 3074F SYST BP LT 130 MM HG: CPT | Performed by: ALLERGY & IMMUNOLOGY

## 2025-05-01 PROCEDURE — 3078F DIAST BP <80 MM HG: CPT | Performed by: ALLERGY & IMMUNOLOGY

## 2025-05-01 PROCEDURE — 76705 ECHO EXAM OF ABDOMEN: CPT

## 2025-05-01 PROCEDURE — 99213 OFFICE O/P EST LOW 20 MIN: CPT | Performed by: ALLERGY & IMMUNOLOGY

## 2025-05-01 PROCEDURE — 3008F BODY MASS INDEX DOCD: CPT | Performed by: ALLERGY & IMMUNOLOGY

## 2025-05-01 PROCEDURE — 96372 THER/PROPH/DIAG INJ SC/IM: CPT | Performed by: ALLERGY & IMMUNOLOGY

## 2025-05-01 RX ORDER — CETIRIZINE HYDROCHLORIDE 5 MG/1
TABLET ORAL
COMMUNITY

## 2025-05-01 ASSESSMENT — ENCOUNTER SYMPTOMS
RESPIRATORY NEGATIVE: 1
GASTROINTESTINAL NEGATIVE: 1
MUSCULOSKELETAL NEGATIVE: 1
ENDOCRINE NEGATIVE: 1
CONSTITUTIONAL NEGATIVE: 1
EYES NEGATIVE: 1

## 2025-05-01 NOTE — PROGRESS NOTES
Patient ID: Jose Zavala is a 35 y.o. male.     Chief Complaint: follow up    Patient here for Tezspire injection       History Of Present Illness  Jose Zavala is a 35 y.o. male with PMx asthma and allergy, recurrent infections, GERD/dysphagia presenting for follow up.     Food Allergy  No    Eczema/ Atopic Dermatitis  No    Asthma  Trelegy 200  Would like to plan to start biologic due to history of recurrent exacerbations and need for steroids.    Rhinoconjunctivitis  Doing ok with avoidance and medications at this time    Drug Allergy   NO    Insect Allergy   No    Infections  Not since last visit  Low antibody titers with normal IG's      Review of Systems   Constitutional: Negative.    HENT:  Positive for dental problem.    Eyes: Negative.    Respiratory: Negative.     Gastrointestinal: Negative.    Endocrine: Negative.    Musculoskeletal: Negative.    Skin: Negative.    Tooth grinding-will now use     Pertinent positives and negatives have been assessed in the HPI. All other systems have been reviewed and are negative except as noted in the HPI.    Allergies  Patient has no known allergies.    Past Medical History  He has a past medical history of Allergic, Allergic rhinitis, Anxiety, Asthma, Depression, GERD (gastroesophageal reflux disease), Hyperlipidemia, Hypertension, Tinnitus, and Visual impairment.    Family History  Family History   Problem Relation Name Age of Onset    Asthma Father Austin     Asthma Brother Lalito     Asthma Brother Lalito            Surgical History  He has a past surgical history that includes Tympanostomy tube placement (09/20/2016); Mouth surgery (09/20/2016); and Esophagogastroduodenoscopy.    Social/Environmental History  He reports that he has quit smoking. His smoking use included cigarettes. He has a 2.5 pack-year smoking history. He has been exposed to tobacco smoke. He quit smokeless tobacco use about 3 years ago. He reports current alcohol  "use of about 7.0 standard drinks of alcohol per week. He reports that he does not use drugs.        MEDICATIONS  Current Outpatient Medications on File Prior to Visit   Medication Sig Dispense Refill    albuterol 1.25 mg/3 mL nebulizer solution Take 3 mL (1.25 mg) by nebulization every 6 hours if needed for wheezing. 75 mL 11    albuterol 90 mcg/actuation inhaler Inhale 2 puffs every 6 hours if needed for wheezing.      amLODIPine-benazepriL (LotreL) 5-10 mg capsule Take 1 capsule by mouth once daily. 90 capsule 1    atorvastatin (Lipitor) 10 mg tablet Take 1 tablet (10 mg) by mouth once daily. 90 tablet 2    busPIRone (Buspar) 10 mg tablet Take 1 tablet (10 mg) by mouth 3 times a day.      famotidine (Pepcid) 20 mg tablet Take 1 tablet (20 mg) by mouth once daily.      fluticasone (Flonase) 50 mcg/actuation nasal spray Administer 1 spray into each nostril once daily. Shake gently. Before first use, prime pump. After use, clean tip and replace cap. 16 g 11    fluticasone-umeclidin-vilanter (Trelegy Ellipta) 200-62.5-25 mcg blister with device Inhale 1 puff once daily. 60 each 5    montelukast (Singulair) 10 mg tablet Take 1 tablet (10 mg) by mouth once daily at bedtime. 90 tablet 1    multivitamin tablet Take 1 tablet by mouth once daily.      sertraline (Zoloft) 100 mg tablet Take 1 tablet (100 mg) by mouth once daily.      tezepelumab-ekko (Tezspire) SubQ Pen Injector Inject 1.9 mL (1 pen) under the skin every 30 (thirty) days. 1.91 mL 11    cetirizine (ZyrTEC) 5 mg tablet ZyrTEC Allergy      fluticasone (Flonase) 50 mcg/actuation nasal spray Administer 1 spray into each nostril once daily. 16 g 5     No current facility-administered medications on file prior to visit.         Physical Exam  Visit Vitals  /68   Pulse 70   Temp 36.1 °C (97 °F)   Ht 1.727 m (5' 8\")   Wt 93 kg (205 lb)   SpO2 97%   BMI 31.17 kg/m²   Smoking Status Former   BSA 2.11 m²       Wt Readings from Last 1 Encounters:   05/01/25 93 kg " (205 lb)       Physical Exam    General: Well appearing, no acute distress  Head: Normocephalic, atraumatic  Eyes: EOMI, non-injected  Nose: No nasal crease, nares patent  Throat: Normal dentition  Heart: Regular rate and rhythm  Lungs: Clear to auscultation bilaterally, effort normal  Extremities: Moves all extremities symmetrically, no edema  Skin: No rashes/lesions  Psych: normal mood and affect    LAB RESULTS:  CBC:  Recent Labs     01/09/25  1514 09/13/24  1653 07/02/24  1110 05/19/23  1124   WBC 6.5 10.2 5.4 4.9   HGB 17.9* 17.5 16.7 16.4   HCT 52.0 54.3* 51.3 50.6    213 208 192   MCV 92 97 97 97   EOSABS 0.24 0.02  --  0.05       CMP:  Recent Labs     09/13/24 1653 07/02/24  1110 01/09/24  1248    140 139   K 4.2 4.1 4.2    103 102   CO2 24 29 30   ANIONGAP 16 12 11   BUN 14 11 13   CREATININE 0.93 0.82 0.80   EGFR >90 >90 >90     Recent Labs     09/13/24 1653 07/02/24  1110 01/09/24  1248 11/06/23  1329   ALBUMIN 5.1* 4.5 4.6 4.8   ALKPHOS  --  65 64 66   ALT  --  72* 78* 87*   AST  --  31 31 35   BILITOT  --  0.6 0.4 0.6       ALLERGY:   Lab Results   Component Value Date    ICIGE 148 09/19/2024    WHITEASH 0.68 (Low) 09/19/2024    SILVERBIRCH 0.15 (Equiv IgE) 09/19/2024    BOXELDER <0.10 09/19/2024    MOUNTJUNIPER 0.17 (Equiv IgE) 09/19/2024    COTTONWOOD 0.16 (Equiv IgE) 09/19/2024    ELM 0.29 (Equiv IgE) 09/19/2024    MULBERRY <0.10 09/19/2024    PECANHICKORY <0.10 09/19/2024    MAPLESYCAMOR 0.18 (Equiv IgE) 09/19/2024    OAK 0.15 (Equiv IgE) 09/19/2024    BERMUDAGR 0.12 (Equiv IgE) 09/19/2024    JOHNSONGR 0.13 (Equiv IgE) 09/19/2024    BLUEGRASS 0.22 (Equiv IgE) 09/19/2024    TIMOTHYGRASS 0.15 (Equiv IgE) 09/19/2024     Lab Results   Component Value Date    LAMBQUART 0.16 (Equiv IgE) 09/19/2024    PIGWEED <0.10 09/19/2024    COMRAGWEED 0.27 (Equiv IgE) 09/19/2024    RUSSIANT 0.13 (Equiv IgE) 09/19/2024    SHEEPSOR 0.10 (Equiv IgE) 09/19/2024    PLANTAIN 0.15 (Equiv IgE) 09/19/2024     CATEPI <0.10 09/19/2024    DOGEPI 0.49 (Low) 09/19/2024    ALTERNA 4.78 (High) 09/19/2024    CLADHERB 1.09 (Mod) 09/19/2024    ICA04 3.20 (H) 03/11/2025    ICA04 1.64 (Mod) 09/19/2024    PENICILLIUM 0.48 (Low) 09/19/2024    DERMFAR 0.13 (Equiv IgE) 09/19/2024    DERMPTE 0.18 (Equiv IgE) 09/19/2024    COCKR <0.10 09/19/2024       Recent Labs     09/19/24  1109   ICIGE 148     Recent Labs     01/09/25  1514 09/13/24  1653 05/19/23  1124   EOSABS 0.24 0.02 0.05         IMMUNO:   Recent Labs     01/09/25  1514   IGG 1,270      *       HEME/ENDO:  Recent Labs     07/02/24  1110 05/19/23  1124   TSH 1.02 1.54   HGBA1C 5.1  --    ACT-20  Tezspire injections today. Teaching completed. (5/1/25)  Assessment/Plan   Jose is a 36 yo man with a history of asthma, allergy, dysphagia and recurrent infections.   -continue with current asthma medications. No refills needed today. Has completed X2 injections of Tezspire without adverse event and feeling well.  -Discussed allergy avoidance measures. Will plan repeat ABPA in a couple months.  -Repeat titers s/p pneumovax improved.  Follow up 3 months  Krystle Dumas DO

## 2025-05-08 ENCOUNTER — SPECIALTY PHARMACY (OUTPATIENT)
Dept: PHARMACY | Facility: CLINIC | Age: 35
End: 2025-05-08

## 2025-05-08 PROCEDURE — RXMED WILLOW AMBULATORY MEDICATION CHARGE

## 2025-05-15 ENCOUNTER — APPOINTMENT (OUTPATIENT)
Dept: PULMONOLOGY | Facility: HOSPITAL | Age: 35
End: 2025-05-15
Payer: COMMERCIAL

## 2025-05-18 PROCEDURE — RXMED WILLOW AMBULATORY MEDICATION CHARGE

## 2025-05-20 PROCEDURE — RXMED WILLOW AMBULATORY MEDICATION CHARGE

## 2025-05-23 ENCOUNTER — PHARMACY VISIT (OUTPATIENT)
Dept: PHARMACY | Facility: CLINIC | Age: 35
End: 2025-05-23
Payer: COMMERCIAL

## 2025-06-02 ENCOUNTER — APPOINTMENT (OUTPATIENT)
Dept: ALLERGY | Facility: CLINIC | Age: 35
End: 2025-06-02
Payer: COMMERCIAL

## 2025-06-03 ENCOUNTER — APPOINTMENT (OUTPATIENT)
Dept: OTOLARYNGOLOGY | Facility: CLINIC | Age: 35
End: 2025-06-03
Payer: COMMERCIAL

## 2025-06-06 ENCOUNTER — SPECIALTY PHARMACY (OUTPATIENT)
Dept: PHARMACY | Facility: CLINIC | Age: 35
End: 2025-06-06

## 2025-06-20 PROCEDURE — RXMED WILLOW AMBULATORY MEDICATION CHARGE

## 2025-06-21 ENCOUNTER — PHARMACY VISIT (OUTPATIENT)
Dept: PHARMACY | Facility: CLINIC | Age: 35
End: 2025-06-21
Payer: COMMERCIAL

## 2025-06-24 DIAGNOSIS — J45.50 SEVERE PERSISTENT ASTHMA WITHOUT COMPLICATION (MULTI): ICD-10-CM

## 2025-06-26 ENCOUNTER — PHARMACY VISIT (OUTPATIENT)
Dept: PHARMACY | Facility: CLINIC | Age: 35
End: 2025-06-26
Payer: COMMERCIAL

## 2025-07-15 ENCOUNTER — SPECIALTY PHARMACY (OUTPATIENT)
Dept: PHARMACY | Facility: CLINIC | Age: 35
End: 2025-07-15

## 2025-07-17 PROCEDURE — RXMED WILLOW AMBULATORY MEDICATION CHARGE

## 2025-07-21 ENCOUNTER — SPECIALTY PHARMACY (OUTPATIENT)
Dept: PHARMACY | Facility: CLINIC | Age: 35
End: 2025-07-21

## 2025-07-21 NOTE — PROGRESS NOTES
"  [facility] Clinical Note  Patient Reassessment     Introduction  Jose Zavala is a 35 y.o. male who is on the specialty pharmacy service for management of: Pulmonology Core.      [facility] supplied medication: Tezspire 210mg under the skin every 30 days     Duration of therapy: Maintenance    The most recent encounter visit with the referring prescriber Krystle Dumas DO on 5/1/2025 and 5/5/2025 (patient message) was reviewed. Pharmacy will continue to collaborate in the care of this patient with the referring prescriber.    Discussion  Jose was contacted on 7/21/2025 at 1:30 PM for a pharmacy visit with encounter number [id number] from:   [facility]  [address] [place] [id number]  Dept: [phone]  Dept Fax: [phone]  Jose consented to a Telephone visit, which was performed.    Efficacy  Patient has developed new symptoms of condition: No  Patient/caregiver feels medication is affecting the disease state: Feels about the same. He denies any asthma exacerbations since starting. Discussed efficacy timeline with him. May take a few more doses to see full response.    Goals  Provided education on goals and possible outcomes of therapy:  Adherence with therapy  Timely completion of appropriate labs  Timely and appropriate follow up with provider  Identify and address medication interactions with presciption medications, OTC medications and supplements  Optimize or maintain quality of life  Asthma/Immunology: Improve FEV1 (asthma, COPD target)  Reduce frequency of asthma symptoms such as coughing/wheezing, shortness of breath, and chest tightness  Reduce need for \"prn\" inhalers (asthma)  Patient has documented target(s) for goals of therapy: No  Patient status for goal(s): Unable to assess FEV1 as no recent PFT in chart    Tolerance  Patient has experienced side effects from this medication: Headache - provider office " told him to take Tylenol or Motrin before  Changes to current therapy regimen: No    The follow-up timeline was discussed. Every person responds to and reacts to therapy differently. Patient should be assessed for efficacy and tolerability in approximately: 3 months     Adherence  Patient Information  Informant: Self (Patient)  Demonstrates Understanding of Importance of Adherence: Yes  Does the patient have any barriers to self-administration (including physical and mental?): No  Support Network for Adherence: Healthcare Provider  Adherence Tools Used: Calendar  Medication Information  Medication: tezepelumab-ekko (Tezspire)  Patient Reported Missed Doses in the Last 4 Weeks: 0  Estimated Medication Adherence Level: Good  Adherence Estimation Source: Claims history (Patient)  Barriers to Adherence: No Problems identified     The importance of adherence was discussed and patient/caregiver was advised to take the medication as prescribed by their provider. Encouraged patient/caregiver to call physician's office or specialty pharmacy if they have a question regarding a missed dose.    General Assessment  Changes to home medications, OTCs or supplements: Clotrimazole for thrush  Current Medications[1]  Reported new allergies: No  Reported new medical conditions: No  Additional monitoring reviewed: No  Is laboratory follow up needed? Per provider, PFT    Advised to contact the pharmacy if there are any changes to the patient's medication list, including prescriptions, OTC medications, herbal products, or supplements.    Impression/Plan  This patient has not been identified as high risk due to Lack of high risk qualifiers.  The following action was taken:N/A    QOL/Patient Satisfaction  Rate your quality of life on scale of 1-10: 8  Rate your satisfaction with  Specialty Pharmacy on scale of 1-10: 9    Provided contact information ([phone]) for [facility] and reviewed dispensing  process, refill timeline and patient management follow up. Confirmed understanding of education conducted during assessment. All questions and concerns were addressed and patient/caregiver was encouraged to reach out for additional questions or concerns.    Based on the patient's diagnosis, medication list, progress towards goals, adherence, tolerance, and medication list, medication remains appropriate: Therapy remains appropriate (I attest)    Deepali Chauhan, PharmD       [1]   Current Outpatient Medications   Medication Sig Dispense Refill    albuterol 1.25 mg/3 mL nebulizer solution Take 3 mL (1.25 mg) by nebulization every 6 hours if needed for wheezing. 75 mL 11    albuterol 90 mcg/actuation inhaler Inhale 2 puffs every 6 hours if needed for wheezing.      amLODIPine-benazepriL (LotreL) 5-10 mg capsule Take 1 capsule by mouth once daily. 90 capsule 1    atorvastatin (Lipitor) 10 mg tablet Take 1 tablet (10 mg) by mouth once daily. 90 tablet 2    busPIRone (Buspar) 10 mg tablet Take 1 tablet (10 mg) by mouth 3 times a day.      cetirizine (ZyrTEC) 5 mg tablet ZyrTEC Allergy      clotrimazole (Mycelex) 10 mg adenike Take 1 tablet (10 mg) by mouth 5 times a day for 14 days. 70 Adenike 0    famotidine (Pepcid) 20 mg tablet Take 1 tablet (20 mg) by mouth once daily.      fluticasone (Flonase) 50 mcg/actuation nasal spray Administer 1 spray into each nostril once daily. Shake gently. Before first use, prime pump. After use, clean tip and replace cap. 16 g 11    fluticasone (Flonase) 50 mcg/actuation nasal spray Administer 1 spray into each nostril once daily. 16 g 5    fluticasone-umeclidin-vilanter (Trelegy Ellipta) 200-62.5-25 mcg blister with device Inhale 1 puff once daily. 60 each 5    montelukast (Singulair) 10 mg tablet Take 1 tablet (10 mg) by mouth once daily at bedtime. 90 tablet 1    multivitamin tablet Take 1 tablet by mouth once daily.      sertraline (Zoloft) 100 mg tablet Take 1 tablet (100 mg) by mouth  once daily.      tezepelumab-ekko (Tezspire) SubQ Pen Injector Inject 1.9 mL (1 pen) under the skin every 30 (thirty) days. 1.91 mL 11     No current facility-administered medications for this visit.

## 2025-07-22 PROCEDURE — RXMED WILLOW AMBULATORY MEDICATION CHARGE

## 2025-07-25 ENCOUNTER — PHARMACY VISIT (OUTPATIENT)
Dept: PHARMACY | Facility: CLINIC | Age: 35
End: 2025-07-25
Payer: COMMERCIAL

## 2025-07-26 ENCOUNTER — PHARMACY VISIT (OUTPATIENT)
Dept: PHARMACY | Facility: CLINIC | Age: 35
End: 2025-07-26
Payer: COMMERCIAL

## 2025-07-26 PROCEDURE — RXMED WILLOW AMBULATORY MEDICATION CHARGE

## 2025-07-29 ENCOUNTER — PHARMACY VISIT (OUTPATIENT)
Dept: PHARMACY | Facility: CLINIC | Age: 35
End: 2025-07-29
Payer: COMMERCIAL

## 2025-08-07 ENCOUNTER — OFFICE VISIT (OUTPATIENT)
Dept: PULMONOLOGY | Facility: HOSPITAL | Age: 35
End: 2025-08-07
Payer: COMMERCIAL

## 2025-08-07 VITALS
HEART RATE: 83 BPM | SYSTOLIC BLOOD PRESSURE: 120 MMHG | OXYGEN SATURATION: 96 % | BODY MASS INDEX: 32.39 KG/M2 | DIASTOLIC BLOOD PRESSURE: 74 MMHG | WEIGHT: 213 LBS | TEMPERATURE: 97.6 F

## 2025-08-07 DIAGNOSIS — J30.9 ALLERGIC RHINITIS, UNSPECIFIED SEASONALITY, UNSPECIFIED TRIGGER: ICD-10-CM

## 2025-08-07 DIAGNOSIS — J45.909 ASTHMA, UNSPECIFIED ASTHMA SEVERITY, UNSPECIFIED WHETHER COMPLICATED, UNSPECIFIED WHETHER PERSISTENT (HHS-HCC): Primary | ICD-10-CM

## 2025-08-07 DIAGNOSIS — R06.02 SHORTNESS OF BREATH: ICD-10-CM

## 2025-08-07 PROCEDURE — 3078F DIAST BP <80 MM HG: CPT | Performed by: NURSE PRACTITIONER

## 2025-08-07 PROCEDURE — 99214 OFFICE O/P EST MOD 30 MIN: CPT | Performed by: NURSE PRACTITIONER

## 2025-08-07 PROCEDURE — 3074F SYST BP LT 130 MM HG: CPT | Performed by: NURSE PRACTITIONER

## 2025-08-07 PROCEDURE — 99213 OFFICE O/P EST LOW 20 MIN: CPT | Performed by: NURSE PRACTITIONER

## 2025-08-07 RX ORDER — ALBUTEROL SULFATE 90 UG/1
2 INHALANT RESPIRATORY (INHALATION) EVERY 6 HOURS PRN
Qty: 18 G | Refills: 11 | Status: SHIPPED | OUTPATIENT
Start: 2025-08-07

## 2025-08-07 RX ORDER — FLUTICASONE FUROATE, UMECLIDINIUM BROMIDE AND VILANTEROL TRIFENATATE 200; 62.5; 25 UG/1; UG/1; UG/1
1 POWDER RESPIRATORY (INHALATION) DAILY
Qty: 60 EACH | Refills: 5 | Status: SHIPPED | OUTPATIENT
Start: 2025-08-07

## 2025-08-07 ASSESSMENT — ENCOUNTER SYMPTOMS
VOMITING: 0
EYE PAIN: 0
PALPITATIONS: 0
VOICE CHANGE: 0
DIARRHEA: 0
NAUSEA: 0
FATIGUE: 0
HEADACHES: 0
JOINT SWELLING: 0
NERVOUS/ANXIOUS: 0
MYALGIAS: 0
ARTHRALGIAS: 0
WEAKNESS: 0
FEVER: 0
AGITATION: 0
BACK PAIN: 0
ABDOMINAL PAIN: 0
SINUS PRESSURE: 0
NUMBNESS: 0
DIZZINESS: 0
RHINORRHEA: 0

## 2025-08-07 ASSESSMENT — PAIN SCALES - GENERAL: PAINLEVEL_OUTOF10: 0-NO PAIN

## 2025-08-11 ENCOUNTER — SPECIALTY PHARMACY (OUTPATIENT)
Dept: PHARMACY | Facility: CLINIC | Age: 35
End: 2025-08-11

## 2025-08-12 ENCOUNTER — APPOINTMENT (OUTPATIENT)
Dept: OTOLARYNGOLOGY | Facility: CLINIC | Age: 35
End: 2025-08-12
Payer: COMMERCIAL

## 2025-08-12 VITALS — HEIGHT: 68 IN | BODY MASS INDEX: 31.6 KG/M2 | WEIGHT: 208.5 LBS

## 2025-08-12 DIAGNOSIS — J34.89 NASAL AND SINUS DISCHARGE: ICD-10-CM

## 2025-08-12 DIAGNOSIS — Z86.19 HISTORY OF THRUSH: Primary | ICD-10-CM

## 2025-08-12 DIAGNOSIS — R09.82 POST-NASAL DRAINAGE: ICD-10-CM

## 2025-08-12 PROCEDURE — 3008F BODY MASS INDEX DOCD: CPT

## 2025-08-12 PROCEDURE — 99212 OFFICE O/P EST SF 10 MIN: CPT

## 2025-08-12 RX ORDER — AZELASTINE 1 MG/ML
2 SPRAY, METERED NASAL 2 TIMES DAILY
Qty: 30 ML | Refills: 3 | Status: SHIPPED | OUTPATIENT
Start: 2025-08-12 | End: 2025-09-11

## 2025-08-12 RX ORDER — CLOTRIMAZOLE 10 MG/1
10 LOZENGE ORAL
Qty: 70 TROCHE | Refills: 1 | Status: SHIPPED | OUTPATIENT
Start: 2025-08-12 | End: 2025-08-26

## 2025-08-12 ASSESSMENT — PATIENT HEALTH QUESTIONNAIRE - PHQ9
SUM OF ALL RESPONSES TO PHQ9 QUESTIONS 1 AND 2: 0
1. LITTLE INTEREST OR PLEASURE IN DOING THINGS: NOT AT ALL
2. FEELING DOWN, DEPRESSED OR HOPELESS: NOT AT ALL

## 2025-08-18 PROCEDURE — RXMED WILLOW AMBULATORY MEDICATION CHARGE

## 2025-08-21 PROCEDURE — RXMED WILLOW AMBULATORY MEDICATION CHARGE

## 2025-08-23 ENCOUNTER — PHARMACY VISIT (OUTPATIENT)
Dept: PHARMACY | Facility: CLINIC | Age: 35
End: 2025-08-23
Payer: COMMERCIAL

## 2025-08-28 ENCOUNTER — PHARMACY VISIT (OUTPATIENT)
Dept: PHARMACY | Facility: CLINIC | Age: 35
End: 2025-08-28
Payer: COMMERCIAL

## 2025-09-18 ENCOUNTER — APPOINTMENT (OUTPATIENT)
Dept: ALLERGY | Facility: CLINIC | Age: 35
End: 2025-09-18
Payer: COMMERCIAL

## 2025-10-30 ENCOUNTER — APPOINTMENT (OUTPATIENT)
Dept: PRIMARY CARE | Facility: CLINIC | Age: 35
End: 2025-10-30
Payer: COMMERCIAL